# Patient Record
Sex: MALE | Race: WHITE | URBAN - METROPOLITAN AREA
[De-identification: names, ages, dates, MRNs, and addresses within clinical notes are randomized per-mention and may not be internally consistent; named-entity substitution may affect disease eponyms.]

---

## 2019-04-03 ENCOUNTER — OFFICE VISIT (OUTPATIENT)
Dept: INTERNAL MEDICINE CLINIC | Age: 34
End: 2019-04-03
Payer: MEDICAID

## 2019-04-03 VITALS
DIASTOLIC BLOOD PRESSURE: 68 MMHG | WEIGHT: 266.8 LBS | OXYGEN SATURATION: 98 % | HEIGHT: 77 IN | RESPIRATION RATE: 16 BRPM | SYSTOLIC BLOOD PRESSURE: 126 MMHG | HEART RATE: 59 BPM | BODY MASS INDEX: 31.5 KG/M2

## 2019-04-03 DIAGNOSIS — R06.81 APNEA SPELL: ICD-10-CM

## 2019-04-03 DIAGNOSIS — R06.83 SNORING: ICD-10-CM

## 2019-04-03 DIAGNOSIS — D17.9 MULTIPLE LIPOMAS: ICD-10-CM

## 2019-04-03 DIAGNOSIS — M70.62 GREATER TROCHANTERIC BURSITIS OF LEFT HIP: ICD-10-CM

## 2019-04-03 DIAGNOSIS — Z00.00 PHYSICAL EXAM, ANNUAL: ICD-10-CM

## 2019-04-03 DIAGNOSIS — J45.20 MILD INTERMITTENT REACTIVE AIRWAY DISEASE WITHOUT COMPLICATION: ICD-10-CM

## 2019-04-03 PROBLEM — J45.909 REACTIVE AIRWAY DISEASE WITHOUT COMPLICATION: Status: ACTIVE | Noted: 2019-04-03

## 2019-04-03 LAB
BILIRUBIN, POC: NORMAL
BLOOD URINE, POC: NORMAL
CLARITY, POC: CLEAR
COLOR, POC: YELLOW
GLUCOSE URINE, POC: NORMAL
KETONES, POC: NORMAL
LEUKOCYTE EST, POC: NORMAL
NITRITE, POC: NORMAL
PH, POC: 7
PROTEIN, POC: NORMAL
SPECIFIC GRAVITY, POC: 1.02
UROBILINOGEN, POC: 0.2

## 2019-04-03 PROCEDURE — 90471 IMMUNIZATION ADMIN: CPT | Performed by: INTERNAL MEDICINE

## 2019-04-03 PROCEDURE — 81002 URINALYSIS NONAUTO W/O SCOPE: CPT | Performed by: INTERNAL MEDICINE

## 2019-04-03 PROCEDURE — 90715 TDAP VACCINE 7 YRS/> IM: CPT | Performed by: INTERNAL MEDICINE

## 2019-04-03 PROCEDURE — 99385 PREV VISIT NEW AGE 18-39: CPT | Performed by: INTERNAL MEDICINE

## 2019-04-03 RX ORDER — ALBUTEROL SULFATE 90 UG/1
2 AEROSOL, METERED RESPIRATORY (INHALATION) EVERY 6 HOURS PRN
Qty: 1 INHALER | Refills: 0 | Status: SHIPPED | OUTPATIENT
Start: 2019-04-03 | End: 2021-01-12

## 2019-04-03 SDOH — HEALTH STABILITY: MENTAL HEALTH: HOW OFTEN DO YOU HAVE A DRINK CONTAINING ALCOHOL?: 2-3 TIMES A WEEK

## 2019-04-03 SDOH — HEALTH STABILITY: MENTAL HEALTH: HOW MANY STANDARD DRINKS CONTAINING ALCOHOL DO YOU HAVE ON A TYPICAL DAY?: 1 OR 2

## 2019-04-03 ASSESSMENT — ENCOUNTER SYMPTOMS
WHEEZING: 1
CHEST TIGHTNESS: 0
BACK PAIN: 1
SORE THROAT: 0
COUGH: 1
SHORTNESS OF BREATH: 0
GASTROINTESTINAL NEGATIVE: 1

## 2019-04-03 ASSESSMENT — PATIENT HEALTH QUESTIONNAIRE - PHQ9
SUM OF ALL RESPONSES TO PHQ QUESTIONS 1-9: 0
SUM OF ALL RESPONSES TO PHQ QUESTIONS 1-9: 0
1. LITTLE INTEREST OR PLEASURE IN DOING THINGS: 0
SUM OF ALL RESPONSES TO PHQ9 QUESTIONS 1 & 2: 0
2. FEELING DOWN, DEPRESSED OR HOPELESS: 0

## 2019-04-03 NOTE — PATIENT INSTRUCTIONS
Advised exercises to stretch iliofemoral band   Can use albuterol inhaler as need for wheezing ,if it gets worse.   Will check las -fasting  Continue weight loss measures and regular physical activities  See me as needed

## 2019-04-03 NOTE — PROGRESS NOTES
4/3/2019    Yennifer Garcia (:  1985) is a 26 y.o. male, here for evaluation of the following medical concerns:Annual visit. HPI  Had no health problems before  Has no symptoms and feels good  Once in a while has hip pain like under surface and feels numb under skin of left thigh and sore some times and had it for long time -more than 10 years and no getting worse and mostly when standing and rest relieves symptoms  He feels pain almost always and more when standing and no symptoms when sitting or sleeping. shows pain area lateral anterior thigh  Had several attempts at spinal anaesthesia for his ACL and meniscus  tear of right knee  Has no cp gi or gu symptoms   May have chronic cough when he lays down like  A tickle and some times makes him cough and has occasional wheezing  No symptoms during day and some times kat feels it when he runs in cold weather  Review of Systems   Constitutional: Negative for activity change, appetite change, fatigue, fever and unexpected weight change (lost 20 pounds in 1 year by trying to). HENT: Negative for postnasal drip, sneezing and sore throat. Eyes: Negative for visual disturbance. Respiratory: Positive for cough and wheezing. Negative for chest tightness and shortness of breath. Cardiovascular: Negative for chest pain, palpitations and leg swelling. Gastrointestinal: Negative. Endocrine: Negative. Genitourinary: Negative. Musculoskeletal: Positive for back pain (on and off). Arthralgias: some times in knees. Neurological: Negative for dizziness, light-headedness and headaches. Psychiatric/Behavioral: Negative for dysphoric mood and sleep disturbance (snores a lot and some  times and has erratic braething -noted by her fiancee and stops braething too and soem times sleepy during day  and used to have fatigue all the time. ). The patient is not nervous/anxious.         Prior to Visit Medications    Not on File        No Known Allergies    Past Medical History:   Diagnosis Date    ADHD (attention deficit hyperactivity disorder)     ADD     Anxiety     Depression        Past Surgical History:   Procedure Laterality Date    KNEE SURGERY      right        Social History     Socioeconomic History    Marital status: Single     Spouse name: Not on file    Number of children: Not on file    Years of education: Not on file    Highest education level: Not on file   Occupational History    Not on file   Social Needs    Financial resource strain: Not on file    Food insecurity:     Worry: Not on file     Inability: Not on file    Transportation needs:     Medical: Not on file     Non-medical: Not on file   Tobacco Use    Smoking status: Former Smoker     Types: Cigarettes    Smokeless tobacco: Never Used   Substance and Sexual Activity    Alcohol use: Yes     Frequency: 2-3 times a week     Drinks per session: 1 or 2     Binge frequency: Less than monthly    Drug use: Yes     Types: Marijuana    Sexual activity: Not on file   Lifestyle    Physical activity:     Days per week: Not on file     Minutes per session: Not on file    Stress: Not on file   Relationships    Social connections:     Talks on phone: Not on file     Gets together: Not on file     Attends Temple service: Not on file     Active member of club or organization: Not on file     Attends meetings of clubs or organizations: Not on file     Relationship status: Not on file    Intimate partner violence:     Fear of current or ex partner: Not on file     Emotionally abused: Not on file     Physically abused: Not on file     Forced sexual activity: Not on file   Other Topics Concern    Not on file   Social History Narrative    Not on file        Family History   Problem Relation Age of Onset    Arthritis Mother     Anemia Father     Arthritis Maternal Grandmother     Alcohol Abuse Maternal Grandfather     Stroke Maternal Grandfather     Alzheimer's Disease Paternal Grandmother Vitals:    04/03/19 1301   BP: 126/68   Site: Right Upper Arm   Position: Sitting   Cuff Size: Large Adult   Pulse: 59   Resp: 16   SpO2: 98%   Weight: 266 lb 12.8 oz (121 kg)   Height: 6' 5\" (1.956 m)     Estimated body mass index is 31.64 kg/m² as calculated from the following:    Height as of this encounter: 6' 5\" (1.956 m). Weight as of this encounter: 266 lb 12.8 oz (121 kg). Physical Exam   Constitutional: He is oriented to person, place, and time. He appears well-developed and well-nourished. No distress. HENT:   Head: Normocephalic. Right Ear: Tympanic membrane, external ear and ear canal normal.   Left Ear: Tympanic membrane, external ear and ear canal normal.   Nose: Nose normal.   Mouth/Throat: Oropharynx is clear and moist. No oropharyngeal exudate. Eyes: Pupils are equal, round, and reactive to light. Conjunctivae and EOM are normal. No scleral icterus. Neck: No JVD present. No thyromegaly present. Cardiovascular: Normal rate, regular rhythm, normal heart sounds and intact distal pulses. Exam reveals no gallop. No murmur heard. Pulmonary/Chest: Breath sounds normal. No respiratory distress. He has no wheezes (has no forced exp cough or wheez). He has no rales. Abdominal: Soft. Bowel sounds are normal. He exhibits no distension and no mass. There is no hepatosplenomegaly. There is no tenderness. Musculoskeletal: He exhibits no edema. Lymphadenopathy:     He has no cervical adenopathy. Neurological: He is alert and oriented to person, place, and time. He displays normal reflexes.  Coordination normal.   Has no weakness in his lower extremities and SLR negative on both sides  Has slight left troch tenderness+ and increases with abduction of LLE   Skin:   Has multiple 1-2 cm lipomas felt different areas of hsi body  Has f/o folliculitis type lesion -right forearm where his guitar comes in contact with hs fore arm and he is a mucician       ASSESSMENT/PLAN:  Possible he may have chronic mild trochanteric bursitis  Multiple lipomas  Has h/o folliculitis type lesions right fore arm  May have mild exercise induced asthma/RAD-has a dog and cat in house. No follow-ups on file. Plan    Advised exercises to stretch iliofemoral band   Can use albuterol inhaler as need for wheezing ,if it gets worse. Will check las -fasting  Continue weight loss measures and regular physical activities  See me as needed  An  electronic signature was used to authenticate this note.     --Alice Villa MD on 4/3/2019 at 1:17 PM

## 2019-05-03 PROBLEM — Z00.00 PHYSICAL EXAM, ANNUAL: Status: RESOLVED | Noted: 2019-04-03 | Resolved: 2019-05-03

## 2019-11-05 ENCOUNTER — NURSE ONLY (OUTPATIENT)
Dept: INTERNAL MEDICINE CLINIC | Age: 34
End: 2019-11-05
Payer: MEDICAID

## 2019-11-05 DIAGNOSIS — Z23 NEED FOR INFLUENZA VACCINATION: Primary | ICD-10-CM

## 2019-11-05 PROCEDURE — 90471 IMMUNIZATION ADMIN: CPT | Performed by: INTERNAL MEDICINE

## 2019-11-05 PROCEDURE — 90686 IIV4 VACC NO PRSV 0.5 ML IM: CPT | Performed by: INTERNAL MEDICINE

## 2019-11-22 ENCOUNTER — TELEPHONE (OUTPATIENT)
Dept: INTERNAL MEDICINE CLINIC | Age: 34
End: 2019-11-22

## 2021-01-07 ENCOUNTER — TELEPHONE (OUTPATIENT)
Dept: INTERNAL MEDICINE CLINIC | Age: 36
End: 2021-01-07

## 2021-01-07 NOTE — TELEPHONE ENCOUNTER
----- Message from Ever Crain sent at 1/7/2021  4:17 PM EST -----  Subject: Appointment Request    Reason for Call: Routine Physical Exam    QUESTIONS  Type of Appointment? Established Patient  Reason for appointment request? No appointments available during search  Additional Information for Provider? Patient called to schedule an Annual   Physical. Please give a call PT   ---------------------------------------------------------------------------  --------------  CALL BACK INFO  What is the best way for the office to contact you? OK to leave message on   voicemail  Preferred Call Back Phone Number? [phone]  ---------------------------------------------------------------------------  --------------  SCRIPT ANSWERS  Relationship to Patient? Self  Appointment reason? Well Care/Follow Ups  Select a Well Care/Follow Ups appointment reason? Adult Physical Exam   [Medicare Annual Wellness   AWV   PAP   Pelvic]  (If the patient is Medicare / 65+ ask this question) Are you requesting a   Medicare Annual Wellness Visit? No  (If the patient is female   ask this question) Are you requesting a pap smear with your physical   exam? No  (Is the patient requesting their annual physical and does not need PAP or   AWV per above)? Yes   Have you been diagnosed with   tested for   or told that you are suspected of having COVID-19 (Coronavirus)? No  Have you had a fever or taken medication to treat a fever within the past   3 days? No  Have you had a cough   shortness of breath or flu-like symptoms within the past 3 days? No  Do you currently have flu-like symptoms including fever or chills   cough   shortness of breath   or difficulty breathing   or new loss of taste or smell? No  (Service Expert  click yes below to proceed with FFFavs As Usual   Scheduling)?  Yes

## 2021-01-12 ENCOUNTER — OFFICE VISIT (OUTPATIENT)
Dept: INTERNAL MEDICINE CLINIC | Age: 36
End: 2021-01-12
Payer: MEDICAID

## 2021-01-12 VITALS
BODY MASS INDEX: 30.58 KG/M2 | OXYGEN SATURATION: 98 % | TEMPERATURE: 98 F | SYSTOLIC BLOOD PRESSURE: 120 MMHG | DIASTOLIC BLOOD PRESSURE: 80 MMHG | HEART RATE: 80 BPM | HEIGHT: 77 IN | RESPIRATION RATE: 16 BRPM | WEIGHT: 259 LBS

## 2021-01-12 DIAGNOSIS — G89.29 CHRONIC PAIN OF RIGHT HAND: ICD-10-CM

## 2021-01-12 DIAGNOSIS — Z13.31 POSITIVE DEPRESSION SCREENING: Primary | ICD-10-CM

## 2021-01-12 DIAGNOSIS — D17.9 MULTIPLE LIPOMAS: ICD-10-CM

## 2021-01-12 DIAGNOSIS — M79.641 CHRONIC PAIN OF RIGHT HAND: ICD-10-CM

## 2021-01-12 DIAGNOSIS — F43.20 ADJUSTMENT DISORDER, UNSPECIFIED TYPE: ICD-10-CM

## 2021-01-12 DIAGNOSIS — Z00.00 ANNUAL PHYSICAL EXAM: ICD-10-CM

## 2021-01-12 LAB
BILIRUBIN, POC: NORMAL
BLOOD URINE, POC: NORMAL
CLARITY, POC: NORMAL
COLOR, POC: NORMAL
GLUCOSE URINE, POC: NORMAL
KETONES, POC: NORMAL
LEUKOCYTE EST, POC: NORMAL
NITRITE, POC: NORMAL
PH, POC: 6.5
PROTEIN, POC: NORMAL
SPECIFIC GRAVITY, POC: >=1.03
UROBILINOGEN, POC: NORMAL

## 2021-01-12 PROCEDURE — G8484 FLU IMMUNIZE NO ADMIN: HCPCS | Performed by: INTERNAL MEDICINE

## 2021-01-12 PROCEDURE — G8431 POS CLIN DEPRES SCRN F/U DOC: HCPCS | Performed by: INTERNAL MEDICINE

## 2021-01-12 PROCEDURE — 81002 URINALYSIS NONAUTO W/O SCOPE: CPT | Performed by: INTERNAL MEDICINE

## 2021-01-12 PROCEDURE — 99395 PREV VISIT EST AGE 18-39: CPT | Performed by: INTERNAL MEDICINE

## 2021-01-12 PROCEDURE — 93000 ELECTROCARDIOGRAM COMPLETE: CPT | Performed by: INTERNAL MEDICINE

## 2021-01-12 SDOH — ECONOMIC STABILITY: FOOD INSECURITY: WITHIN THE PAST 12 MONTHS, THE FOOD YOU BOUGHT JUST DIDN'T LAST AND YOU DIDN'T HAVE MONEY TO GET MORE.: NEVER TRUE

## 2021-01-12 SDOH — ECONOMIC STABILITY: FOOD INSECURITY: WITHIN THE PAST 12 MONTHS, YOU WORRIED THAT YOUR FOOD WOULD RUN OUT BEFORE YOU GOT MONEY TO BUY MORE.: NEVER TRUE

## 2021-01-12 SDOH — ECONOMIC STABILITY: TRANSPORTATION INSECURITY
IN THE PAST 12 MONTHS, HAS THE LACK OF TRANSPORTATION KEPT YOU FROM MEDICAL APPOINTMENTS OR FROM GETTING MEDICATIONS?: NO

## 2021-01-12 SDOH — ECONOMIC STABILITY: TRANSPORTATION INSECURITY
IN THE PAST 12 MONTHS, HAS LACK OF TRANSPORTATION KEPT YOU FROM MEETINGS, WORK, OR FROM GETTING THINGS NEEDED FOR DAILY LIVING?: NO

## 2021-01-12 ASSESSMENT — PATIENT HEALTH QUESTIONNAIRE - PHQ9
7. TROUBLE CONCENTRATING ON THINGS, SUCH AS READING THE NEWSPAPER OR WATCHING TELEVISION: 2
3. TROUBLE FALLING OR STAYING ASLEEP: 1
9. THOUGHTS THAT YOU WOULD BE BETTER OFF DEAD, OR OF HURTING YOURSELF: 1
SUM OF ALL RESPONSES TO PHQ QUESTIONS 1-9: 18
4. FEELING TIRED OR HAVING LITTLE ENERGY: 3
SUM OF ALL RESPONSES TO PHQ QUESTIONS 1-9: 18
2. FEELING DOWN, DEPRESSED OR HOPELESS: 1
6. FEELING BAD ABOUT YOURSELF - OR THAT YOU ARE A FAILURE OR HAVE LET YOURSELF OR YOUR FAMILY DOWN: 3

## 2021-01-12 ASSESSMENT — ENCOUNTER SYMPTOMS
TROUBLE SWALLOWING: 0
CHEST TIGHTNESS: 0
SORE THROAT: 0
SHORTNESS OF BREATH: 0
ABDOMINAL PAIN: 0
GASTROINTESTINAL NEGATIVE: 1
RHINORRHEA: 0
COUGH: 0

## 2021-01-12 ASSESSMENT — COLUMBIA-SUICIDE SEVERITY RATING SCALE - C-SSRS: 1. WITHIN THE PAST MONTH, HAVE YOU WISHED YOU WERE DEAD OR WISHED YOU COULD GO TO SLEEP AND NOT WAKE UP?: NO

## 2021-01-12 NOTE — PATIENT INSTRUCTIONS
Should decrease consumption of coffee to 2 cups daily   Need to loose weight and exercise regularly   Referral to hand surgeon for evaluation of his hand pain   Referral to see psychologist for evaluation of his symptoms -he is agreeable for evaluations  Need fasting labs

## 2021-01-12 NOTE — PROGRESS NOTES
2021    Alfredo Portillo (:  1985) is a 38 y.o. male, here for a preventive medicine evaluation. Quit smoking cigarettes and he feels better with his breathing and quit in 10  Drinks lot of coffee  And has no symptoms with activity  Has multiple spots under his skin and not cause any symptoms and not increasing in size  Has no cp gi or gu symptoms   At times his right hand hurts and he plays [employer] and also few months back fel with his rigth hand stretched and had pain over his and for few weeks but did not see swelling or decrease in rom and since schwarz eneida when he presses on it  Feels depressed for years and seem to be more last few months -he has issues with climate change politics and with peoples divide and at times argues with his partner and can not forget fr few weeks and some times feels he can not do any thing correctly   Victor Hugo Correia sno ideas of hurting himself and not feel overwhelmed and has no change in sleep appetite or weight and nto feel anxious but thinks about issues a lot  He was treated for ADHD in his childhood and never treated  for depression . Patient Active Problem List   Diagnosis    Reactive airway disease without complication    Greater trochanteric bursitis of left hip    Multiple lipomas    Snoring    Apnea spell       Review of Systems   Constitutional: Negative for activity change, appetite change, fatigue and unexpected weight change. HENT: Negative for hearing loss, postnasal drip, rhinorrhea, sore throat and trouble swallowing. Eyes: Negative for visual disturbance. Respiratory: Negative for cough, chest tightness and shortness of breath. Cardiovascular: Negative for chest pain, palpitations and leg swelling. Gastrointestinal: Negative. Negative for abdominal pain (has no heart burns if he eats right foods ). Genitourinary: Negative. Musculoskeletal: Arthralgias: sorenes sin joints but no pain or stiffness. Psychiatric/Behavioral: Positive for dysphoric mood. Negative for agitation (at times when in arguments with his partner ), behavioral problems, sleep disturbance and suicidal ideas. Decreased concentration: was treated before for ADHD  The patient is not nervous/anxious. Prior to Visit Medications    Not on File        No Known Allergies    Past Medical History:   Diagnosis Date    ADHD (attention deficit hyperactivity disorder)     ADD     Anxiety     Depression        Past Surgical History:   Procedure Laterality Date    KNEE SURGERY      right        Social History     Socioeconomic History    Marital status: Life Partner     Spouse name: Not on file    Number of children: Not on file    Years of education: Not on file    Highest education level: Not on file   Occupational History    Not on file   Social Needs    Financial resource strain: Not hard at all   [employer] insecurity     Worry: Never true     Inability: Never true   [employer] needs     Medical: No     Non-medical: No   Tobacco Use    Smoking status: Former Smoker     Packs/day: 0.25     Types: Cigarettes    Smokeless tobacco: Never Used    Tobacco comment: may be 1/4 pack a day and some days not smoke at all.    Substance and Sexual Activity    Alcohol use: Yes     Frequency: 2-3 times a week     Drinks per session: 1 or 2     Binge frequency: Less than monthly     Comment: 2 times a week and 2 beers    Drug use: Yes     Types: Marijuana    Sexual activity: Not on file   Lifestyle    Physical activity     Days per week: Not on file     Minutes per session: Not on file    Stress: Not on file   Relationships    Social connections     Talks on phone: Not on file     Gets together: Not on file     Attends Temple service: Not on file     Active member of club or organization: Not on file     Attends meetings of clubs or organizations: Not on file     Relationship status: Not on file    Intimate partner violence Fear of current or ex partner: Not on file     Emotionally abused: Not on file     Physically abused: Not on file     Forced sexual activity: Not on file   Other Topics Concern    Not on file   Social History Narrative    Not on file        Family History   Problem Relation Age of Onset    Arthritis Mother     Anemia Father     Arthritis Maternal Grandmother     Alcohol Abuse Maternal Grandfather     Stroke Maternal Grandfather     Alzheimer's Disease Paternal Grandmother        ADVANCE DIRECTIVE: N, <no information>    Vitals:    01/12/21 1332   BP: 130/70   Site: Left Upper Arm   Position: Sitting   Cuff Size: Large Adult   Pulse: 58   Resp: 16   Temp: 98 °F (36.7 °C)   SpO2: 98%   Weight: 259 lb (117.5 kg)   Height: 6' 5\" (1.956 m)     Estimated body mass index is 30.71 kg/m² as calculated from the following:    Height as of this encounter: 6' 5\" (1.956 m). Weight as of this encounter: 259 lb (117.5 kg). Physical Exam  Vitals signs and nursing note reviewed. Constitutional:       General: He is not in acute distress. Appearance: Normal appearance. HENT:      Head: Normocephalic. Right Ear: Tympanic membrane, ear canal and external ear normal. There is no impacted cerumen. Left Ear: Tympanic membrane, ear canal and external ear normal. There is no impacted cerumen. Nose: Nose normal. No congestion or rhinorrhea. Mouth/Throat:      Mouth: Mucous membranes are moist.      Pharynx: Oropharynx is clear. No oropharyngeal exudate or posterior oropharyngeal erythema. Eyes:      General: No scleral icterus. Extraocular Movements: Extraocular movements intact. Conjunctiva/sclera: Conjunctivae normal.      Pupils: Pupils are equal, round, and reactive to light. Neck:      Thyroid: No thyromegaly. Vascular: No carotid bruit or JVD. Cardiovascular:      Rate and Rhythm: Normal rate and regular rhythm. Pulses: Normal pulses. Heart sounds: Normal heart sounds. No murmur. No gallop. Comments: Has occasional skip beats in his rhythm  Pulmonary:      Effort: No respiratory distress. Breath sounds: Normal breath sounds. No wheezing, rhonchi or rales. Abdominal:      General: Bowel sounds are normal. There is no distension. Palpations: Abdomen is soft. There is no hepatomegaly, splenomegaly or mass. Tenderness: There is no abdominal tenderness. Hernia: No hernia is present. Genitourinary:     Penis: Normal.       Testes: Normal.   Musculoskeletal:      Right lower leg: No edema. Left lower leg: No edema. Comments: Ha s no swelling or tenderness rigth wrist and hand and has no deformity and rom are full and has no bone tenderness of carpal or metacarpal bones    Lymphadenopathy:      Cervical: No cervical adenopathy. Skin:     Comments: Has multiple 1-2 cm lipomas oyer is arms below left breast and over his lumbar spine and all are soft fluffy round or oval and moveable and regular margins   Neurological:      General: No focal deficit present. Mental Status: He is alert and oriented to person, place, and time. Psychiatric:         Mood and Affect: Mood normal.         Thought Content: Thought content normal.         Judgment: Judgment normal.         No flowsheet data found. No results found for: CHOL, CHOLFAST, TRIG, TRIGLYCFAST, HDL, LDLCHOLESTEROL, LDLCALC, GLUF, GLUCOSE, LABA1C    The ASCVD Risk score ([address] et al., 2013) failed to calculate for the following reasons:     The 2013 ASCVD risk score is only valid for ages 36 to 78    Immunization History   Administered Date(s) Administered    Influenza, Quadv, IM, PF (6 mo and older Fluzone, Flulaval, Fluarix, and 3 yrs and older Afluria) 11/05/2019    Tdap (Boostrix, Adacel) 04/03/2019       Health Maintenance   Topic Date Due    Hepatitis C screen  1985

## 2021-01-26 ENCOUNTER — NURSE ONLY (OUTPATIENT)
Dept: INTERNAL MEDICINE CLINIC | Age: 36
End: 2021-01-26
Payer: MEDICAID

## 2021-01-26 DIAGNOSIS — Z00.00 ANNUAL PHYSICAL EXAM: Primary | ICD-10-CM

## 2021-01-26 LAB
A/G RATIO: 2.4 (ref 1.1–2.2)
ALBUMIN SERPL-MCNC: 4.8 G/DL (ref 3.4–5)
ALP BLD-CCNC: 66 U/L (ref 40–129)
ALT SERPL-CCNC: 16 U/L (ref 10–40)
ANION GAP SERPL CALCULATED.3IONS-SCNC: 9 MMOL/L (ref 3–16)
AST SERPL-CCNC: 18 U/L (ref 15–37)
BASOPHILS ABSOLUTE: 0 K/UL (ref 0–0.2)
BASOPHILS RELATIVE PERCENT: 0.6 %
BILIRUB SERPL-MCNC: 0.5 MG/DL (ref 0–1)
BUN BLDV-MCNC: 21 MG/DL (ref 7–20)
CALCIUM SERPL-MCNC: 9.5 MG/DL (ref 8.3–10.6)
CHLORIDE BLD-SCNC: 103 MMOL/L (ref 99–110)
CHOLESTEROL, TOTAL: 143 MG/DL (ref 0–199)
CO2: 29 MMOL/L (ref 21–32)
CREAT SERPL-MCNC: 1.1 MG/DL (ref 0.9–1.3)
EOSINOPHILS ABSOLUTE: 0.2 K/UL (ref 0–0.6)
EOSINOPHILS RELATIVE PERCENT: 4.4 %
GFR AFRICAN AMERICAN: >60
GFR NON-AFRICAN AMERICAN: >60
GLOBULIN: 2 G/DL
GLUCOSE BLD-MCNC: 94 MG/DL (ref 70–99)
HCT VFR BLD CALC: 44.1 % (ref 40.5–52.5)
HDLC SERPL-MCNC: 48 MG/DL (ref 40–60)
HEMOGLOBIN: 15 G/DL (ref 13.5–17.5)
HEPATITIS C ANTIBODY INTERPRETATION: NORMAL
LDL CHOLESTEROL CALCULATED: 81 MG/DL
LYMPHOCYTES ABSOLUTE: 1.6 K/UL (ref 1–5.1)
LYMPHOCYTES RELATIVE PERCENT: 35.6 %
MCH RBC QN AUTO: 30.2 PG (ref 26–34)
MCHC RBC AUTO-ENTMCNC: 34.1 G/DL (ref 31–36)
MCV RBC AUTO: 88.7 FL (ref 80–100)
MONOCYTES ABSOLUTE: 0.3 K/UL (ref 0–1.3)
MONOCYTES RELATIVE PERCENT: 6.4 %
NEUTROPHILS ABSOLUTE: 2.3 K/UL (ref 1.7–7.7)
NEUTROPHILS RELATIVE PERCENT: 53 %
PDW BLD-RTO: 13 % (ref 12.4–15.4)
PLATELET # BLD: 183 K/UL (ref 135–450)
PMV BLD AUTO: 9.2 FL (ref 5–10.5)
POTASSIUM SERPL-SCNC: 4.8 MMOL/L (ref 3.5–5.1)
RBC # BLD: 4.97 M/UL (ref 4.2–5.9)
SODIUM BLD-SCNC: 141 MMOL/L (ref 136–145)
TOTAL PROTEIN: 6.8 G/DL (ref 6.4–8.2)
TRIGL SERPL-MCNC: 68 MG/DL (ref 0–150)
TSH SERPL DL<=0.05 MIU/L-ACNC: 2.85 UIU/ML (ref 0.27–4.2)
VLDLC SERPL CALC-MCNC: 14 MG/DL
WBC # BLD: 4.4 K/UL (ref 4–11)

## 2021-01-26 PROCEDURE — 36415 COLL VENOUS BLD VENIPUNCTURE: CPT | Performed by: INTERNAL MEDICINE

## 2021-01-27 LAB
HIV AG/AB: NORMAL
HIV ANTIGEN: NORMAL
HIV-1 ANTIBODY: NORMAL
HIV-2 AB: NORMAL

## 2021-02-11 PROBLEM — Z00.00 ANNUAL PHYSICAL EXAM: Status: RESOLVED | Noted: 2021-01-12 | Resolved: 2021-02-11

## 2021-09-15 ENCOUNTER — OFFICE VISIT (OUTPATIENT)
Dept: PSYCHOLOGY | Age: 36
End: 2021-09-15
Payer: MEDICAID

## 2021-09-15 DIAGNOSIS — F90.2 ATTENTION DEFICIT HYPERACTIVITY DISORDER, COMBINED TYPE, MODERATE: ICD-10-CM

## 2021-09-15 DIAGNOSIS — F34.1 PERSISTENT DEPRESSIVE DISORDER WITH ANXIOUS DISTRESS, CURRENTLY SEVERE: Primary | ICD-10-CM

## 2021-09-15 PROCEDURE — 1036F TOBACCO NON-USER: CPT | Performed by: PSYCHOLOGIST

## 2021-09-15 PROCEDURE — 90791 PSYCH DIAGNOSTIC EVALUATION: CPT | Performed by: PSYCHOLOGIST

## 2021-09-15 ASSESSMENT — ANXIETY QUESTIONNAIRES
5. BEING SO RESTLESS THAT IT IS HARD TO SIT STILL: 2-OVER HALF THE DAYS
2. NOT BEING ABLE TO STOP OR CONTROL WORRYING: 2-OVER HALF THE DAYS
6. BECOMING EASILY ANNOYED OR IRRITABLE: 2-OVER HALF THE DAYS
GAD7 TOTAL SCORE: 18
1. FEELING NERVOUS, ANXIOUS, OR ON EDGE: 3-NEARLY EVERY DAY
3. WORRYING TOO MUCH ABOUT DIFFERENT THINGS: 3-NEARLY EVERY DAY
7. FEELING AFRAID AS IF SOMETHING AWFUL MIGHT HAPPEN: 3-NEARLY EVERY DAY
4. TROUBLE RELAXING: 3-NEARLY EVERY DAY

## 2021-09-15 ASSESSMENT — PATIENT HEALTH QUESTIONNAIRE - PHQ9
2. FEELING DOWN, DEPRESSED OR HOPELESS: 3
SUM OF ALL RESPONSES TO PHQ9 QUESTIONS 1 & 2: 6
8. MOVING OR SPEAKING SO SLOWLY THAT OTHER PEOPLE COULD HAVE NOTICED. OR THE OPPOSITE, BEING SO FIGETY OR RESTLESS THAT YOU HAVE BEEN MOVING AROUND A LOT MORE THAN USUAL: 1
6. FEELING BAD ABOUT YOURSELF - OR THAT YOU ARE A FAILURE OR HAVE LET YOURSELF OR YOUR FAMILY DOWN: 3
3. TROUBLE FALLING OR STAYING ASLEEP: 1
9. THOUGHTS THAT YOU WOULD BE BETTER OFF DEAD, OR OF HURTING YOURSELF: 1
SUM OF ALL RESPONSES TO PHQ QUESTIONS 1-9: 20
4. FEELING TIRED OR HAVING LITTLE ENERGY: 3
SUM OF ALL RESPONSES TO PHQ QUESTIONS 1-9: 19
7. TROUBLE CONCENTRATING ON THINGS, SUCH AS READING THE NEWSPAPER OR WATCHING TELEVISION: 2
SUM OF ALL RESPONSES TO PHQ QUESTIONS 1-9: 20
5. POOR APPETITE OR OVEREATING: 3
1. LITTLE INTEREST OR PLEASURE IN DOING THINGS: 3

## 2021-09-15 NOTE — PROGRESS NOTES
Behavioral Health Consultation  Aristides Merritt, Ph.D.  Psychologist  9/15/2021  10:58 AM EDT      Time spent with Patient: 30 minutes  This is patient's first [facility] appointment. Reason for Consult: anxious, worried, some anger  Referring Provider: Yokasta Rose, [address] [facility] [id number]    Feedback for PCP:     Pt provided informed consent for the behavioral health program. Discussed with patient model of service to include the limits of confidentiality (i.e. abuse reporting, suicide intervention, etc.) and short-term intervention focused approach. Pt indicated understanding. Feedback given to PCP. S:  Patient reports that he feels as though he's needed help for a while but has been reluctant to seek help. He said he came in today because his partner with whom he has one child and another on the way, suggested it. 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 He stays at home with their 6year-old. He says that he feels criticized in most everything he tries to do, and feels \"unappreciated\" for what he is doing when it seems it is only what he's not doing that is pointed out. He said that they bought a house when they became pregnant and that\" I didn't feel like I had much of a say in it,\" saying that she pointed out that \"it's my money. \" When he mentioned that one of his bandmates \"got a sweet new guitar,\" she said something about him getting a job, \"when my job is being a musician. \" It seems as though she wants him to be more like her and less like himself, according to the patient. He describes feeling as though he's frequently put in a double bind. He is quick to point out that he is not perfect, but the way it's feeling doesn't feel good. He said he had been diagnosed with ADHD as a child and was treated with Ritalin, but having ADHD \"made me feel like there's something wrong with me. \"        Social History     Tobacco Use    Smoking status: Former Smoker     Packs/day: 0.25     Types: Cigarettes    Smokeless tobacco: Never Used    Tobacco comment: may be 1/4 pack a day and some days not smoke at all. Substance Use Topics    Alcohol use: Yes     Comment: 2 times a week and 2 beers        Illicit drugs:   Social History     Substance and Sexual Activity   Drug Use Yes    Types: Marijuana        O:  MSE:  Appearance: good hygiene   Attitude: cooperative and friendly  Consciousness: alert  Orientation: oriented to person, place, time, general circumstance  Memory: recent and remote memory intact  Attention/Concentration: intact during session  Psychomotor Activity: normal  Eye Contact: normal  Speech: normal rate and volume, well-articulated  Mood: anxious, sad  Affect: congruent  Perception: within normal limits  Thought Content: within normal limits  Thought Process: logical, coherent and goal-directed  Insight: good  Judgment: intact  Ability to understand instructions: Yes  Ability to respond meaningfully: Yes  Morbid Ideation: no   Suicide Assessment: no suicidal ideation, plan, or intent  Homicidal Ideation: no    A:  We talked about communication and elements of fair fighting, and that what he's describing is a couple communication problem and not his individual problem, that the dynamic between them might need some attention. He said that he is terrified of splitting up because his family has always been close and marriages intact, whereas she has not had that experience. He was given the Adult ADHD RS-IV self-report form, not for diagnosis because he has been previously diagnosed, but to see how he is experiencing ADHD currently. WILDA 7 SCORE 9/15/2021   WILDA-7 Total Score 18     Interpretation of WILDA-7 score: 5-9 = mild anxiety, 10-14 = moderate anxiety, 15+ = severe anxiety. Recommend referral to behavioral health for scores 10 or greater.     PHQ Scores 9/15/2021 1/12/2021 4/3/2019   PHQ2 Score 6 3 0   PHQ9 Score 20 18 0     Interpretation of Total Score Depression Severity: 1-4 = Minimal depression, 5-9 = Mild depression, 10-14 = Moderate depression, 15-19 = Moderately severe depression, 20-27 = Severe depression    Diagnosis:    1. Persistent depressive disorder with anxious distress, currently severe    2. Attention deficit hyperactivity disorder, combined type, moderate        Patient Active Problem List   Diagnosis    Reactive airway disease without complication    Greater trochanteric bursitis of left hip    Multiple lipomas    Snoring    Apnea spell    Adjustment disorder         Plan:  Pt interventions:  Established rapport, Summit-setting to identify pt's primary goals for [facility] visit / overall health, Supportive techniques, Provided Psychoeducation re: healthy communication patterns and Provided handout on Adult ADHD RS-IV assessment.        Pt Behavioral Change Plan:  Pt set the following goals:  Pt scheduled F/U visit in two weeks

## 2021-09-29 ENCOUNTER — OFFICE VISIT (OUTPATIENT)
Dept: PSYCHOLOGY | Age: 36
End: 2021-09-29
Payer: MEDICAID

## 2021-09-29 DIAGNOSIS — F43.23 ADJUSTMENT DISORDER WITH MIXED ANXIETY AND DEPRESSED MOOD: Primary | ICD-10-CM

## 2021-09-29 DIAGNOSIS — F90.2 ATTENTION DEFICIT HYPERACTIVITY DISORDER, COMBINED TYPE, MODERATE: ICD-10-CM

## 2021-09-29 PROCEDURE — 90832 PSYTX W PT 30 MINUTES: CPT | Performed by: PSYCHOLOGIST

## 2021-09-29 PROCEDURE — 1036F TOBACCO NON-USER: CPT | Performed by: PSYCHOLOGIST

## 2021-09-29 ASSESSMENT — ANXIETY QUESTIONNAIRES
7. FEELING AFRAID AS IF SOMETHING AWFUL MIGHT HAPPEN: 2-OVER HALF THE DAYS
1. FEELING NERVOUS, ANXIOUS, OR ON EDGE: 3-NEARLY EVERY DAY
6. BECOMING EASILY ANNOYED OR IRRITABLE: 2-OVER HALF THE DAYS
5. BEING SO RESTLESS THAT IT IS HARD TO SIT STILL: 1-SEVERAL DAYS
4. TROUBLE RELAXING: 2-OVER HALF THE DAYS
GAD7 TOTAL SCORE: 15
2. NOT BEING ABLE TO STOP OR CONTROL WORRYING: 2-OVER HALF THE DAYS
3. WORRYING TOO MUCH ABOUT DIFFERENT THINGS: 3-NEARLY EVERY DAY

## 2021-09-29 ASSESSMENT — PATIENT HEALTH QUESTIONNAIRE - PHQ9
SUM OF ALL RESPONSES TO PHQ QUESTIONS 1-9: 2
SUM OF ALL RESPONSES TO PHQ QUESTIONS 1-9: 2
2. FEELING DOWN, DEPRESSED OR HOPELESS: 1
SUM OF ALL RESPONSES TO PHQ9 QUESTIONS 1 & 2: 2
1. LITTLE INTEREST OR PLEASURE IN DOING THINGS: 1
SUM OF ALL RESPONSES TO PHQ QUESTIONS 1-9: 2

## 2021-09-29 NOTE — PROGRESS NOTES
Behavioral Health Consultation  Meera Villarreal, Ph.D.  Psychologist  9/29/2021  11:30 AM EDT      Time spent with Patient: 30 minutes  This is patient's second [facility] appointment. Reason for Consult: anxious, worried, some anger  Referring Provider: Chasidy Hobson, [facility] [id number]    Feedback for PCP:     Pt provided informed consent for the behavioral health program. Discussed with patient model of service to include the limits of confidentiality (i.e. abuse reporting, suicide intervention, etc.) and short-term intervention focused approach. Pt indicated understanding. Feedback given to PCP. S:  Patient completed the Adult ADHD-RS-IV assessment even though he has already been diagnosed because the items help identify common frustrations not only the the patient but for his domestic partner as well. He described the situations that are most likely to cause conflict/disagreemnets an they seem to stem primarily from differences in temperament and emotional range, with the patient seeingly having a wider range of emotion as well as comfort expressing feelings. Social History     Tobacco Use    Smoking status: Former Smoker     Packs/day: 0.25     Types: Cigarettes    Smokeless tobacco: Never Used    Tobacco comment: may be 1/4 pack a day and some days not smoke at all.    Substance Use Topics    Alcohol use: Yes     Comment: 2 times a week and 2 beers        Illicit drugs:   Social History     Substance and Sexual Activity   Drug Use Yes    Types: Marijuana        O:  MSE:  Appearance: good hygiene   Attitude: cooperative and friendly  Consciousness: alert  Orientation: oriented to person, place, time, general circumstance  Memory: recent and remote memory intact  Attention/Concentration: intact during session  Psychomotor Activity: normal  Eye Contact: normal  Speech: normal rate and volume, well-articulated  Mood: anxious  Affect: congruent  Perception: within normal limits  Thought Content: within normal limits  Thought Process: logical, coherent and goal-directed  Insight: good  Judgment: intact  Ability to understand instructions: Yes  Ability to respond meaningfully: Yes  Morbid Ideation: no   Suicide Assessment: no suicidal ideation, plan, or intent  Homicidal Ideation: no    A:  We talked about strategies for increasing healthy communication exchanges as well as conflict management. The patient mentioned that his DP has stated that she has thought that she might have ADHD as well so the patient was given a SASI for her. He was given handouts on monitoring thinking errors as well as examples of common thinking errors. He was also given a handout on communication tips for couples. WILDA 7 SCORE 9/29/2021 9/15/2021   WILDA-7 Total Score 15 18     Interpretation of WILDA-7 score: 5-9 = mild anxiety, 10-14 = moderate anxiety, 15+ = severe anxiety. Recommend referral to behavioral health for scores 10 or greater. PHQ Scores 9/29/2021 9/15/2021 1/12/2021 4/3/2019   PHQ2 Score 2 6 3 0   PHQ9 Score 2 20 18 0     Interpretation of Total Score Depression Severity: 1-4 = Minimal depression, 5-9 = Mild depression, 10-14 = Moderate depression, 15-19 = Moderately severe depression, 20-27 = Severe depression    Diagnosis:    1. Adjustment disorder with mixed anxiety and depressed mood    2. Attention deficit hyperactivity disorder, combined type, moderate        Patient Active Problem List   Diagnosis    Reactive airway disease without complication    Greater trochanteric bursitis of left hip    Multiple lipomas    Snoring    Apnea spell    Adjustment disorder         Plan:  Pt interventions:  Established rapport, [place]-setting to identify pt's primary goals for [facility] visit / overall health, Supportive techniques, Provided Psychoeducation re: healthy communication patterns and Provided handout on Adult ADHD RS-IV assessment.        Pt Behavioral Change Plan:  Pt set the following goals:  Pt scheduled F/U visit in two weeks  See section 'A'

## 2021-09-29 NOTE — PATIENT INSTRUCTIONS
The [address] [facility]) Consultant giving you this message provides team-based care to treat the mind and body. He works directly with your doctor, who will always stay in charge of your care. Most [address] visits are 30 minutes or less. Usually, the [facility] provider and your doctor continue to see you until you are starting to do better and have a good plan in place for continued progress. Once that happens, most patients follow-up with just their doctor (though the [address] [place] [address] Street provider remains available to you as needed). If you are not improving, or if you desire outside mental health treatment, or if your doctor recommends more specialized help, we will be happy to help you find a mental health specialist in the community. Please also note your health insurance may be billed for [address] visits. Check with your insurance company, and tell the [address] [place] [address] Street provider, if you have any questions about your [address] coverage. Personal Thought  Control. Our thinking often creates anxiety for us. Getting better control of our thinking can go a long way in helping us cope. The following steps can be useful. 1. Let yourself become aware of thoughts you have when you are anxious. What are the words that you are saying to yourself at that moment? Sometimes it takes a little practice before we become aware of our thoughts. Some examples might be:  I know something bad is going to happen, or This is horrible or [place] is this happening to me!?  2. Write your thoughts down. Its much easier to work with our thoughts, analyze them, and replace them if they are in black and white.   3. Ask yourself the following questions about your thoughts:  a. Is it true? (Is it logically correct? Where is the evidence to support the truth of that thought? Are there alternative ways of thinking that would be more correct?). If a thought is not as true as it could be, replace it with a more realistic and helpful one.   The majority of thoughts we have that generate anxiety are not the most realistic appraisals of the situation. b. So what? (If this is logically correct, what does it mean to me? Is there anything I can do about the situation? Is it in my best interest to get anxious about this?). 4. Use coping self-statements. When feeling anxious, you may be able to tell yourself automatic phrases without thinking too much about it. A couple of examples would be phrases such as Its OK, I can handle it, or Ive been through things like this before and have done all right.   Notice that these statements tend to be true for all of us. 5. Notice a change in your emotional state as you change your thinking. As your thoughts become more realistic, you will probably notice a decrease in anxiety and tension, and an increase in your ability to cope. Thinking Errors That Increase Stress, Anger, Depression, Anxiety, and Worry  All-or-Nothing Thinking. You see things in black-and-white categories. It is either one thing or another; there is no room for anything in between. I'm 100% healthy or I must have a fatal disease.   Jumping to Conclusions. You make a negative interpretation even though there are no definite facts that convincingly support your conclusion. My  is late because he has been in a car accident and is now injured on the side of the road.   Fortune-Telling. You anticipate things will turn out badly, convinced the prediction is a fact. Not getting this job will cause us to lose the house.   Should Statements. Musts and oughts are also offenders. Emotional consequences can include anxiety and anger. I should be able to handle this.    Overgeneralization. Assuming one event is actually a pattern. My hand is a little shaky today, I must have Parkinson's disease.   Disqualifying the Positive. Filtering out or rejecting positive experiences to maintain negative beliefs.  Upon hearing that your spouse has checked all the doors and windows and they are all locked you think, But someone could cut out a piece of glass and open the window.   Catastrophizing. Predicting the worst possible outcome imaginable. Terrible, awful, horrible, worst ever might be key words. If I can't get my heart to stop pounding I'm going to die.    Superstitious Thinking. The thought that something you do prevents something awful from happening. [place] my spouse a hug and telling her to be careful before going to work will prevent her from getting in a wreck. I do it every morning and she hasn't gotten in a wreck yet.   Emotional Reasoning. The belief that because you feel a certain way means that the assumptions and association you have with that feeling are true. The fear, doom, and constant anxiety must mean something is seriously wrong with me.     Malhotra-Aquino assessment     15 Suggestions for Constructive Conflict Management    1. Be specific when you introduce a gripe. 2. Dont just complain; no matter how specifically; ask for a reasonable change that will relieve the gripe. 3. Ask for and give feedback of the major points, to make sure you are heard, to assure your partner that you understand what he (or she) wants. 4. Confine yourself to one issue at a time. Otherwise, without professional guidance, you may skip back and forth, evading the hard ones. 5. Do not be glib or intolerant. Be open to your own feelings, and equally open to your partners. 6. Always consider compromise. Remember, your partners view of reality may be just as real as yours, even though you may differ. There are not many totally objective realities. 7. Do not allow counterdemands to enter the picture until the original demands are clearly understood, and there has been a clear-cut response to them.   8. Never assume that you know what your partner is thinking until you have checked out the assumption in plain language; or assume or predict how he (or she) will react, what he (or she) will accept or reject. Crystall-ball gazing is not for pairing. 9. \"Dont mind rape. \"  [place] correct a partners statement of his (or her) feeling. Do not tell a partner what he (or she) should know or feel. 10. Never put labels on a partner. Do not call him (or her) a coward, nor neurotic, nor a child. If you really believed that he(or she) was incompetent or suffered from some basic flaw, you probably would not be with him (or her). Do not make sweeping labeling judgments about his (or her) feelings, especially about whether or not they are real or important. 6. Sarcasm is dirty fighting. 12. Forget the past and stay with the here and now. What either of you did last year or month or this morning is not as important as what you are doing and feeling right now. The changes you ask cannot possibly be retroactive. Hurts, grievances, and irritations should be brought up at the very earliest moment, or the partner has the right to suspect they may have been saved carefully as weapons. 13. Do not overload your partner with grievances. To do so makes him (or her) feel hopeless and suggests that you have either been hoarding complaints or have not thought through what really troubles you. 14. Meditate. Take time to consult your real thoughts and feelings before speaking. Your surface reaction may mask something deeper and more important. Don't be afraid to close your eyes and think. 13. Remember that there is never a single winner in an honest fight. Both either win or lose more intimacy.

## 2021-10-13 ENCOUNTER — OFFICE VISIT (OUTPATIENT)
Dept: PSYCHOLOGY | Age: 36
End: 2021-10-13
Payer: MEDICAID

## 2021-10-13 DIAGNOSIS — F90.2 ATTENTION DEFICIT HYPERACTIVITY DISORDER, COMBINED TYPE, MODERATE: ICD-10-CM

## 2021-10-13 DIAGNOSIS — F43.23 ADJUSTMENT DISORDER WITH MIXED ANXIETY AND DEPRESSED MOOD: Primary | ICD-10-CM

## 2021-10-13 PROCEDURE — 90832 PSYTX W PT 30 MINUTES: CPT | Performed by: PSYCHOLOGIST

## 2021-10-13 PROCEDURE — 1036F TOBACCO NON-USER: CPT | Performed by: PSYCHOLOGIST

## 2021-10-13 NOTE — PROGRESS NOTES
Behavioral Health Consultation  Ana Leone, Ph.D.  Psychologist  10/13/2021  2:00 PM EDT      Time spent with Patient: 30 minutes  This is patient's third [facility] appointment. Reason for Consult: anxious, worried, some anger  Referring Provider: Adams Desai, [address] [facility] [address]    Feedback for PCP:     Pt provided informed consent for the behavioral health program. Discussed with patient model of service to include the limits of confidentiality (i.e. abuse reporting, suicide intervention, etc.) and short-term intervention focused approach. Pt indicated understanding. Feedback given to PCP. S:  Patient reports that he \"beats myself up\" for not being able to do certain things at certain times. He says he has a list of home projects that he often feels stymied by, and can't get himself to get started. 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 He seems as though these can produce feelings of low perceived competence that can easily lead to dysthymia. Social History     Tobacco Use    Smoking status: Former Smoker     Packs/day: 0.25     Types: Cigarettes    Smokeless tobacco: Never Used    Tobacco comment: may be 1/4 pack a day and some days not smoke at all.    Substance Use Topics    Alcohol use: Yes     Comment: 2 times a week and 2 beers        Illicit drugs:   Social History     Substance and Sexual Activity   Drug Use Yes    Types: Marijuana        O:  MSE:  Appearance: good hygiene   Attitude: cooperative and friendly  Consciousness: alert  Orientation: oriented to person, place, time, general circumstance  Memory: recent and remote memory intact  Attention/Concentration: intact during session  Psychomotor Activity: normal  Eye Contact: normal  Speech: normal rate and volume, well-articulated  Mood: anxious  Affect: congruent  Perception: within normal limits  Thought Content: within normal limits  Thought Process: logical, coherent and goal-directed  Insight: good  Judgment: intact  Ability to understand instructions: Yes  Ability to respond meaningfully: Yes  Morbid Ideation: no   Suicide Assessment: no suicidal ideation, plan, or intent  Homicidal Ideation: no    A:  We talked about strategies to help with executive function deficits with chunking his projects. We talked about ways to find dedicated time for him to devote to music. 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 In general, we're talking about strategies to help raise his perceived competence,which will improve his mood. WILDA 7 SCORE 9/29/2021 9/15/2021   WILDA-7 Total Score 15 18     Interpretation of WILDA-7 score: 5-9 = mild anxiety, 10-14 = moderate anxiety, 15+ = severe anxiety. Recommend referral to behavioral health for scores 10 or greater. PHQ Scores 9/29/2021 9/15/2021 1/12/2021 4/3/2019   PHQ2 Score 2 6 3 0   PHQ9 Score 2 20 18 0     Interpretation of Total Score Depression Severity: 1-4 = Minimal depression, 5-9 = Mild depression, 10-14 = Moderate depression, 15-19 = Moderately severe depression, 20-27 = Severe depression    Diagnosis:    1. Adjustment disorder with mixed anxiety and depressed mood    2. Attention deficit hyperactivity disorder, combined type, moderate        Patient Active Problem List   Diagnosis    Reactive airway disease without complication    Greater trochanteric bursitis of left hip    Multiple lipomas    Snoring    Apnea spell    Adjustment disorder         Plan:  Pt interventions:  Established rapport, [place]-setting to identify pt's primary goals for [facility] visit / overall health, Supportive techniques, Provided Psychoeducation re: healthy communication patterns and Provided handout on Adult ADHD RS-IV assessment.        Pt Behavioral Change Plan:  Pt set the following goals:  Pt scheduled F/U visit in two weeks  See section 'A'

## 2021-10-14 ENCOUNTER — NURSE ONLY (OUTPATIENT)
Dept: INTERNAL MEDICINE CLINIC | Age: 36
End: 2021-10-14
Payer: MEDICAID

## 2021-10-14 PROCEDURE — 90471 IMMUNIZATION ADMIN: CPT | Performed by: INTERNAL MEDICINE

## 2021-10-14 PROCEDURE — 90674 CCIIV4 VAC NO PRSV 0.5 ML IM: CPT | Performed by: INTERNAL MEDICINE

## 2021-10-27 ENCOUNTER — OFFICE VISIT (OUTPATIENT)
Dept: PSYCHOLOGY | Age: 36
End: 2021-10-27
Payer: MEDICAID

## 2021-10-27 DIAGNOSIS — F34.1 PERSISTENT DEPRESSIVE DISORDER WITH ANXIOUS DISTRESS, CURRENTLY SEVERE: Primary | ICD-10-CM

## 2021-10-27 DIAGNOSIS — Z63.8 PARENTING STRESS: ICD-10-CM

## 2021-10-27 PROCEDURE — 90832 PSYTX W PT 30 MINUTES: CPT | Performed by: PSYCHOLOGIST

## 2021-10-27 PROCEDURE — 1036F TOBACCO NON-USER: CPT | Performed by: PSYCHOLOGIST

## 2021-10-27 SDOH — SOCIAL STABILITY - SOCIAL INSECURITY: OTHER SPECIFIED PROBLEMS RELATED TO PRIMARY SUPPORT GROUP: Z63.8

## 2021-10-27 ASSESSMENT — PATIENT HEALTH QUESTIONNAIRE - PHQ9
2. FEELING DOWN, DEPRESSED OR HOPELESS: 2
3. TROUBLE FALLING OR STAYING ASLEEP: 1
6. FEELING BAD ABOUT YOURSELF - OR THAT YOU ARE A FAILURE OR HAVE LET YOURSELF OR YOUR FAMILY DOWN: 2
SUM OF ALL RESPONSES TO PHQ QUESTIONS 1-9: 14
1. LITTLE INTEREST OR PLEASURE IN DOING THINGS: 2
DEPRESSION UNABLE TO ASSESS: PT REFUSES
SUM OF ALL RESPONSES TO PHQ9 QUESTIONS 1 & 2: 4
9. THOUGHTS THAT YOU WOULD BE BETTER OFF DEAD, OR OF HURTING YOURSELF: 1
5. POOR APPETITE OR OVEREATING: 2
SUM OF ALL RESPONSES TO PHQ QUESTIONS 1-9: 14
7. TROUBLE CONCENTRATING ON THINGS, SUCH AS READING THE NEWSPAPER OR WATCHING TELEVISION: 2
SUM OF ALL RESPONSES TO PHQ QUESTIONS 1-9: 13
4. FEELING TIRED OR HAVING LITTLE ENERGY: 2
8. MOVING OR SPEAKING SO SLOWLY THAT OTHER PEOPLE COULD HAVE NOTICED. OR THE OPPOSITE, BEING SO FIGETY OR RESTLESS THAT YOU HAVE BEEN MOVING AROUND A LOT MORE THAN USUAL: 0

## 2021-10-27 ASSESSMENT — ANXIETY QUESTIONNAIRES
6. BECOMING EASILY ANNOYED OR IRRITABLE: 2-OVER HALF THE DAYS
3. WORRYING TOO MUCH ABOUT DIFFERENT THINGS: 2-OVER HALF THE DAYS
2. NOT BEING ABLE TO STOP OR CONTROL WORRYING: 2-OVER HALF THE DAYS
7. FEELING AFRAID AS IF SOMETHING AWFUL MIGHT HAPPEN: 2-OVER HALF THE DAYS
GAD7 TOTAL SCORE: 13
5. BEING SO RESTLESS THAT IT IS HARD TO SIT STILL: 1-SEVERAL DAYS
4. TROUBLE RELAXING: 2-OVER HALF THE DAYS
1. FEELING NERVOUS, ANXIOUS, OR ON EDGE: 2-OVER HALF THE DAYS

## 2021-10-27 NOTE — PROGRESS NOTES
Behavioral Health Consultation  Gloria Partida, Ph.D.  Psychologist  10/27/2021  2:00 PM EDT      Time spent with Patient: 30 minutes  This is [facility]'s [facility] [place]. Reason for Consult: anxious, worried, some anger  Referring Provider: Christina Leonardo, [address] [facility] [address]    Feedback for PCP:     Pt provided informed consent for the behavioral health program. Discussed with patient model of service to include the limits of confidentiality (i.e. abuse reporting, suicide intervention, etc.) and short-term intervention focused approach. Pt indicated understanding. Feedback given to PCP. S:  Patient reports frustration and sadness with his parenting style. He feels bad when he loses his temper with his son Tereso Partida who is 6years old . He said that his intention is to be a good dad, but his good intentions d not seem to be enough. She said that the dynamic with his wife Adela Elizabeth reminds him of the dynamic he felt when he was growing up, where his desire for clarity and \"to get it right\" I met with impatience him feeling like she's saying, \"Duh you moron. \" He talked more about low perceived competence in the things that are the most important to him, and with his wife is seems to be stymied by conflicting communication styles where neither gets any andres from the other, and his parenting approach is inconsistent and frustrating. Many of his challenges seem to stem from deficits in executive functions. Social History     Tobacco Use    Smoking status: Former Smoker     Packs/day: 0.25     Types: Cigarettes    Smokeless tobacco: Never Used    Tobacco comment: may be 1/4 pack a day and some days not smoke at all.    Substance Use Topics    Alcohol use: Yes     Comment: 2 times a week and 2 beers        Illicit drugs:   Social History     Substance and Sexual Activity   Drug Use Yes    Types: Marijuana        O:  MSE:  Appearance: good hygiene   Attitude: cooperative and friendly  Consciousness: alert  Orientation: oriented to person, place, time, general circumstance  Memory: recent and remote memory intact  Attention/Concentration: intact during session  Psychomotor Activity: normal  Eye Contact: normal  Speech: normal rate and volume, well-articulated  Mood: anxious, depressed  Affect: congruent  Perception: within normal limits  Thought Content: within normal limits  Thought Process: logical, coherent and goal-directed  Insight: good  Judgment: intact  Ability to understand instructions: Yes  Ability to respond meaningfully: Yes  Morbid Ideation: no   Suicide Assessment: no suicidal ideation, plan, or intent  Homicidal Ideation: no    A:  We talked about parenting ideas to help him become more consistent with his son, like if he wants his son to do a task, don't make it an \"ask\" and be clear that it's a \"have to. \" We also talked about structuring his approach to parenting to improve his son's compliance. We talked about how it seems as though most of his depression comes from his perceived failure to effectively parent, that these skills can be \"coached. \"    WILDA 7 SCORE 10/27/2021 9/29/2021 9/15/2021   WILDA-7 Total Score 13 15 18     Interpretation of WILDA-7 score: 5-9 = mild anxiety, 10-14 = moderate anxiety, 15+ = severe anxiety. Recommend referral to behavioral health for scores 10 or greater. PHQ Scores 10/27/2021 9/29/2021 9/15/2021 1/12/2021 4/3/2019   PHQ2 Score 4 2 6 3 0   PHQ9 Score 14 2 20 18 0     Interpretation of Total Score Depression Severity: 1-4 = Minimal depression, 5-9 = Mild depression, 10-14 = Moderate depression, 15-19 = Moderately severe depression, 20-27 = Severe depression    Diagnosis:    1. Persistent depressive disorder with anxious distress, currently severe    2.  Parenting stress        Patient Active Problem List   Diagnosis    Reactive airway disease without complication    Greater trochanteric bursitis of left hip    Multiple lipomas    Snoring    Apnea spell    Adjustment disorder         Plan:  Pt interventions:  Established rapport, [place]-setting to identify pt's primary goals for [facility] visit / overall health, Supportive techniques, Provided Psychoeducation re: healthy communication patterns and Provided handout on Adult ADHD RS-IV assessment.        Pt Behavioral Change Plan:  Pt set the following goals:  Pt scheduled F/U visit in two weeks  See section 'A'

## 2021-12-01 ENCOUNTER — OFFICE VISIT (OUTPATIENT)
Dept: PSYCHOLOGY | Age: 36
End: 2021-12-01
Payer: MEDICAID

## 2021-12-01 DIAGNOSIS — F43.23 ADJUSTMENT DISORDER WITH MIXED ANXIETY AND DEPRESSED MOOD: Primary | ICD-10-CM

## 2021-12-01 DIAGNOSIS — F90.2 ATTENTION DEFICIT HYPERACTIVITY DISORDER, COMBINED TYPE, MODERATE: ICD-10-CM

## 2021-12-01 PROCEDURE — 90832 PSYTX W PT 30 MINUTES: CPT | Performed by: PSYCHOLOGIST

## 2021-12-01 PROCEDURE — 1036F TOBACCO NON-USER: CPT | Performed by: PSYCHOLOGIST

## 2021-12-01 ASSESSMENT — ANXIETY QUESTIONNAIRES
3. WORRYING TOO MUCH ABOUT DIFFERENT THINGS: 2-OVER HALF THE DAYS
7. FEELING AFRAID AS IF SOMETHING AWFUL MIGHT HAPPEN: 2-OVER HALF THE DAYS
6. BECOMING EASILY ANNOYED OR IRRITABLE: 3-NEARLY EVERY DAY
5. BEING SO RESTLESS THAT IT IS HARD TO SIT STILL: 2-OVER HALF THE DAYS
1. FEELING NERVOUS, ANXIOUS, OR ON EDGE: 2-OVER HALF THE DAYS
2. NOT BEING ABLE TO STOP OR CONTROL WORRYING: 2-OVER HALF THE DAYS
4. TROUBLE RELAXING: 2-OVER HALF THE DAYS
GAD7 TOTAL SCORE: 15

## 2021-12-01 ASSESSMENT — PATIENT HEALTH QUESTIONNAIRE - PHQ9
SUM OF ALL RESPONSES TO PHQ QUESTIONS 1-9: 1
1. LITTLE INTEREST OR PLEASURE IN DOING THINGS: 1
SUM OF ALL RESPONSES TO PHQ QUESTIONS 1-9: 1
SUM OF ALL RESPONSES TO PHQ QUESTIONS 1-9: 1

## 2021-12-01 NOTE — PROGRESS NOTES
Behavioral Health Consultation  Nury Holland, Ph.D.  Psychologist  12/1/2021  10:00 AM EDT      Time spent with Patient: 30 minutes  This is patient's fifth [facility] appointment. Reason for Consult: anxious, worried, some anger  Referring Provider: Chyna Parker, [address] [facility] [id number]    Feedback for PCP:     Pt provided informed consent for the behavioral health program. Discussed with patient model of service to include the limits of confidentiality (i.e. abuse reporting, suicide intervention, etc.) and short-term intervention focused approach. Pt indicated understanding. Feedback given to PCP. S:  Patient reports that the whole family contracted Covid but all were asymptomatic. He said that he feels as though he's being more mindful when parenting his 6year old, and had some insight about how his loud tone of voice may be interpreted by his son as being angry when he's not. He talked about an interaction with Antonio freitas SO where he assumed that she was being critical of him, and so he reacted defensively, when in fact she was not and couldn't understand what he was talking about. He said that they got it straightened out without a fight, but that particular interaction seemed like a pattern to the patient. Social History     Tobacco Use    Smoking status: Former Smoker     Packs/day: 0.25     Types: Cigarettes    Smokeless tobacco: Never Used    Tobacco comment: may be 1/4 pack a day and some days not smoke at all.    Substance Use Topics    Alcohol use: Yes     Comment: 2 times a week and 2 beers        Illicit drugs:   Social History     Substance and Sexual Activity   Drug Use Yes    Types: Marijuana Hulon Santiago)        O:  MSE:  Appearance: good hygiene   Attitude: cooperative and friendly  Consciousness: alert  Orientation: oriented to person, place, time, general circumstance  Memory: recent and remote memory intact  Attention/Concentration: intact during session  Psychomotor Activity: normal  Eye Contact: normal  Speech: normal rate and volume, well-articulated  Mood: anxious  Affect: congruent  Perception: within normal limits  Thought Content: within normal limits  Thought Process: logical, coherent and goal-directed  Insight: good  Judgment: intact  Ability to understand instructions: Yes  Ability to respond meaningfully: Yes  Morbid Ideation: no   Suicide Assessment: no suicidal ideation, plan, or intent  Homicidal Ideation: no    A:  We talked about how people with ADHD are used to being criticized or corrected as children, and then as adults, sometimes the \"I'm in trouble\"or \"i'm being criticized\" are default assumptions, which can inadvertently cause conflict. We talked about how it could be helpful to check out assumptions and even share this default habit with Helder Maya so she can understand some of his hard to understand reactions. We talked about Driven to Distraction as a helpful book to help explain some of the characteristics of adults with ADHD. WILDA 7 SCORE 12/1/2021 10/27/2021 9/29/2021 9/15/2021   WILDA-7 Total Score 15 13 15 18     Interpretation of WILDA-7 score: 5-9 = mild anxiety, 10-14 = moderate anxiety, 15+ = severe anxiety. Recommend referral to behavioral health for scores 10 or greater. PHQ Scores 12/1/2021 10/27/2021 9/29/2021 9/15/2021 1/12/2021 4/3/2019   PHQ2 Score - 4 2 6 3 0   PHQ9 Score 1 14 2 20 18 0     Interpretation of Total Score Depression Severity: 1-4 = Minimal depression, 5-9 = Mild depression, 10-14 = Moderate depression, 15-19 = Moderately severe depression, 20-27 = Severe depression    Diagnosis:    1. Adjustment disorder with mixed anxiety and depressed mood    2.  Attention deficit hyperactivity disorder, combined type, moderate        Patient Active Problem List   Diagnosis    Reactive airway disease without complication    Greater trochanteric bursitis of left hip    Multiple lipomas    Snoring    Apnea spell    Adjustment disorder         Plan:  Pt interventions:  Established rapport, Seminary-setting to identify pt's primary goals for [facility] visit / overall health, Supportive techniques, Provided Psychoeducation re: healthy communication patterns, Provided handout on Adult ADHD RS-IV assessment and Provided pt book recommendation.        Pt Behavioral Change Plan:  Pt set the following goals:  Pt scheduled F/U visit in two weeks  See section 'A'

## 2021-12-01 NOTE — PATIENT INSTRUCTIONS
The [address] [facility] Consultant giving you this message provides team-based care to treat the mind and body. He works directly with your doctor, who will always stay in charge of your care. Most [address] visits are 30 minutes or less. Usually, the [facility] provider and your doctor continue to see you until you are starting to do better and have a good plan in place for continued progress. Once that happens, most patients follow-up with just their doctor (though the [address] [place] [address] provider remains available to you as needed). If you are not improving, or if you desire outside mental health treatment, or if your doctor recommends more specialized help, we will be happy to help you find a mental health specialist in the community. Please also note your health insurance may be billed for [address] visits. Check with your insurance company, and tell the [facility] provider, if you have any questions about your [address] coverage.     Driven to Distraction by Candi Joseph with ADHD often are compared to 'normal' people or neurotypical   People with ADHD because of their wiring, are often criticized and told 'no' exponentially more than neurotypicals  So consequently, they have the \"I'm in trouble\" or \"I'm being criticized\" feeling> it can become their 'default' assumption and they may respond defensively < as though they are being criticized when they may not be    So recognize the default \"I'm in trouble' feeling, and then check it out-

## 2021-12-15 ENCOUNTER — OFFICE VISIT (OUTPATIENT)
Dept: PSYCHOLOGY | Age: 36
End: 2021-12-15
Payer: MEDICAID

## 2021-12-15 DIAGNOSIS — F43.23 ADJUSTMENT DISORDER WITH MIXED ANXIETY AND DEPRESSED MOOD: Primary | ICD-10-CM

## 2021-12-15 DIAGNOSIS — F90.2 ATTENTION DEFICIT HYPERACTIVITY DISORDER, COMBINED TYPE, MODERATE: ICD-10-CM

## 2021-12-15 PROCEDURE — 90832 PSYTX W PT 30 MINUTES: CPT | Performed by: PSYCHOLOGIST

## 2021-12-15 PROCEDURE — 1036F TOBACCO NON-USER: CPT | Performed by: PSYCHOLOGIST

## 2022-01-05 ENCOUNTER — OFFICE VISIT (OUTPATIENT)
Dept: PSYCHOLOGY | Age: 37
End: 2022-01-05
Payer: MEDICAID

## 2022-01-05 DIAGNOSIS — F90.2 ATTENTION DEFICIT HYPERACTIVITY DISORDER, COMBINED TYPE, MODERATE: ICD-10-CM

## 2022-01-05 DIAGNOSIS — F43.23 ADJUSTMENT DISORDER WITH MIXED ANXIETY AND DEPRESSED MOOD: Primary | ICD-10-CM

## 2022-01-05 PROCEDURE — 1036F TOBACCO NON-USER: CPT | Performed by: PSYCHOLOGIST

## 2022-01-05 PROCEDURE — 90832 PSYTX W PT 30 MINUTES: CPT | Performed by: PSYCHOLOGIST

## 2022-01-05 ASSESSMENT — PATIENT HEALTH QUESTIONNAIRE - PHQ9
9. THOUGHTS THAT YOU WOULD BE BETTER OFF DEAD, OR OF HURTING YOURSELF: 1
7. TROUBLE CONCENTRATING ON THINGS, SUCH AS READING THE NEWSPAPER OR WATCHING TELEVISION: 2
SUM OF ALL RESPONSES TO PHQ9 QUESTIONS 1 & 2: 3
4. FEELING TIRED OR HAVING LITTLE ENERGY: 1
SUM OF ALL RESPONSES TO PHQ QUESTIONS 1-9: 10
3. TROUBLE FALLING OR STAYING ASLEEP: 0
SUM OF ALL RESPONSES TO PHQ QUESTIONS 1-9: 9
SUM OF ALL RESPONSES TO PHQ QUESTIONS 1-9: 10
2. FEELING DOWN, DEPRESSED OR HOPELESS: 2
5. POOR APPETITE OR OVEREATING: 2
1. LITTLE INTEREST OR PLEASURE IN DOING THINGS: 1
SUM OF ALL RESPONSES TO PHQ QUESTIONS 1-9: 10
6. FEELING BAD ABOUT YOURSELF - OR THAT YOU ARE A FAILURE OR HAVE LET YOURSELF OR YOUR FAMILY DOWN: 1

## 2022-01-05 ASSESSMENT — ANXIETY QUESTIONNAIRES
6. BECOMING EASILY ANNOYED OR IRRITABLE: 1-SEVERAL DAYS
3. WORRYING TOO MUCH ABOUT DIFFERENT THINGS: 2-OVER HALF THE DAYS
4. TROUBLE RELAXING: 2-OVER HALF THE DAYS
2. NOT BEING ABLE TO STOP OR CONTROL WORRYING: 2-OVER HALF THE DAYS
1. FEELING NERVOUS, ANXIOUS, OR ON EDGE: 1-SEVERAL DAYS
7. FEELING AFRAID AS IF SOMETHING AWFUL MIGHT HAPPEN: 2-OVER HALF THE DAYS
GAD7 TOTAL SCORE: 12
5. BEING SO RESTLESS THAT IT IS HARD TO SIT STILL: 2-OVER HALF THE DAYS

## 2022-01-05 NOTE — Clinical Note
Thomas Epps- I heard you were under the weather so I hope you feel better soon! This monisha is coming your way towards the end of the month. 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 He's been treated for his ADHD in the past, and wants to consult with you about it now. He's developing some secondary dysphoria and avoidance patterns that are secondary to executive function deficits. He's not excited about medication but he's really bumping into limitations on his efficacy. Thanks!     Reshma Matos

## 2022-01-05 NOTE — PROGRESS NOTES
Behavioral Health Consultation  Trent Landa, Ph.D.  Psychologist  1/5/2022  10:00 AM EDT      Time spent with Patient: 30 minutes  This is patient's sixth [facility] appointment. Reason for Consult: anxious, worried, some anger  Referring Provider: Chloé Waters, [address] [facility] [address]    Feedback for PCP:     Pt provided informed consent for the behavioral health program. Discussed with patient model of service to include the limits of confidentiality (i.e. abuse reporting, suicide intervention, etc.) and short-term intervention focused approach. Pt indicated understanding. Feedback given to PCP. 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 He also said that in his marriage to Alejandra Francois, they seem to have settled into some emotional distance that he finds a little unsatisfying, as it probably reflects her emotional style more than his. He says that he feels as though he's making progress with parenting Lauren Limon his 6year-old son. He presents as more dysphoric than he has in previous visits. Social History     Tobacco Use    Smoking status: Former Smoker     Packs/day: 0.25     Types: Cigarettes    Smokeless tobacco: Never Used    Tobacco comment: may be 1/4 pack a day and some days not smoke at all.    Substance Use Topics    Alcohol use: Yes     Comment: 2 times a week and 2 beers        Illicit drugs:   Social History     Substance and Sexual Activity   Drug Use Yes    Types: Marijuana Fredick Copping)        O:  MSE:  Appearance: good hygiene   Attitude: cooperative and friendly  Consciousness: alert  Orientation: oriented to person, place, time, general circumstance  Memory: recent and remote memory intact  Attention/Concentration: intact during session  Psychomotor Activity: normal  Eye Contact: normal  Speech: normal rate and volume, well-articulated  Mood: less anxious  Affect: congruent  Perception: within normal limits  Thought Content: within normal limits  Thought Process: logical, coherent and goal-directed  Insight: good  Judgment: intact  Ability to understand instructions: Yes  Ability to respond meaningfully: Yes  Morbid Ideation: no   Suicide Assessment: no suicidal ideation, plan, or intent  Homicidal Ideation: no    A:  We talked about strategies to help with his executive function deficits, including using thought [place] bubbles 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 We talked about how avoidance makes these tasks scarier, and that deciding to call just one musician with a specific topic, will help him overcome his avoidance. We talked about having him have a med consult with LAUREN Steiner concerning his ADHD, as he is really bumping into the cognitive characteristics inherent in ADHD, and that he is developing dysphoria and avoidance secondary to ADHD. WILDA 7 SCORE 1/5/2022 12/1/2021 10/27/2021 9/29/2021 9/15/2021   WILDA-7 Total Score 12 15 13 15 18     Interpretation of WILDA-7 score: 5-9 = mild anxiety, 10-14 = moderate anxiety, 15+ = severe anxiety. Recommend referral to behavioral health for scores 10 or greater.     PHQ Scores 1/5/2022 12/1/2021 10/27/2021 9/29/2021 9/15/2021 1/12/2021 4/3/2019   PHQ2 Score 3 - 4 2 6 3 0   PHQ9 Score 10 1 14 2 20 18 0     Interpretation of Total Score Depression Severity: 1-4 = Minimal depression, 5-9 = Mild depression, 10-14 = Moderate depression, 15-19 = Moderately severe depression, 20-27 = Severe depression    Diagnosis:    No diagnosis found. Patient Active Problem List   Diagnosis    Reactive airway disease without complication    Greater trochanteric bursitis of left hip    Multiple lipomas    Snoring    Apnea spell    Adjustment disorder         Plan:  Pt interventions:  Supportive techniques, Provided Psychoeducation re: healthy communication patterns and Provided pt book recommendation.        Pt Behavioral Change Plan:  Pt set the following goals:  Pt scheduled F/U visit in two weeks  See section 'A'

## 2022-01-12 ENCOUNTER — OFFICE VISIT (OUTPATIENT)
Dept: INTERNAL MEDICINE CLINIC | Age: 37
End: 2022-01-12
Payer: MEDICAID

## 2022-01-12 VITALS
BODY MASS INDEX: 31.31 KG/M2 | TEMPERATURE: 97.1 F | HEART RATE: 84 BPM | OXYGEN SATURATION: 97 % | RESPIRATION RATE: 16 BRPM | SYSTOLIC BLOOD PRESSURE: 120 MMHG | DIASTOLIC BLOOD PRESSURE: 80 MMHG | WEIGHT: 264 LBS

## 2022-01-12 DIAGNOSIS — Z00.00 ANNUAL PHYSICAL EXAM: ICD-10-CM

## 2022-01-12 DIAGNOSIS — R06.83 SNORING: ICD-10-CM

## 2022-01-12 DIAGNOSIS — R06.81 APNEA SPELL: Primary | ICD-10-CM

## 2022-01-12 LAB
A/G RATIO: 2.4 (ref 1.1–2.2)
ALBUMIN SERPL-MCNC: 4.5 G/DL (ref 3.4–5)
ALP BLD-CCNC: 56 U/L (ref 40–129)
ALT SERPL-CCNC: 21 U/L (ref 10–40)
ANION GAP SERPL CALCULATED.3IONS-SCNC: 11 MMOL/L (ref 3–16)
AST SERPL-CCNC: 18 U/L (ref 15–37)
BASOPHILS ABSOLUTE: 0 K/UL (ref 0–0.2)
BASOPHILS RELATIVE PERCENT: 0.7 %
BILIRUB SERPL-MCNC: 0.5 MG/DL (ref 0–1)
BILIRUBIN, POC: NORMAL
BLOOD URINE, POC: NORMAL
BUN BLDV-MCNC: 16 MG/DL (ref 7–20)
CALCIUM SERPL-MCNC: 9.5 MG/DL (ref 8.3–10.6)
CHLORIDE BLD-SCNC: 102 MMOL/L (ref 99–110)
CLARITY, POC: CLEAR
CO2: 28 MMOL/L (ref 21–32)
COLOR, POC: NORMAL
CREAT SERPL-MCNC: 0.8 MG/DL (ref 0.9–1.3)
EOSINOPHILS ABSOLUTE: 0.2 K/UL (ref 0–0.6)
EOSINOPHILS RELATIVE PERCENT: 4.5 %
GFR AFRICAN AMERICAN: >60
GFR NON-AFRICAN AMERICAN: >60
GLUCOSE BLD-MCNC: 66 MG/DL (ref 70–99)
GLUCOSE URINE, POC: NORMAL
HCT VFR BLD CALC: 44.2 % (ref 40.5–52.5)
HEMOGLOBIN: 15.1 G/DL (ref 13.5–17.5)
KETONES, POC: NORMAL
LEUKOCYTE EST, POC: NORMAL
LYMPHOCYTES ABSOLUTE: 1.6 K/UL (ref 1–5.1)
LYMPHOCYTES RELATIVE PERCENT: 32.5 %
MCH RBC QN AUTO: 30 PG (ref 26–34)
MCHC RBC AUTO-ENTMCNC: 34.1 G/DL (ref 31–36)
MCV RBC AUTO: 88 FL (ref 80–100)
MONOCYTES ABSOLUTE: 0.4 K/UL (ref 0–1.3)
MONOCYTES RELATIVE PERCENT: 7.4 %
NEUTROPHILS ABSOLUTE: 2.7 K/UL (ref 1.7–7.7)
NEUTROPHILS RELATIVE PERCENT: 54.9 %
NITRITE, POC: NORMAL
PDW BLD-RTO: 13.1 % (ref 12.4–15.4)
PH, POC: 7
PLATELET # BLD: 180 K/UL (ref 135–450)
PMV BLD AUTO: 8.5 FL (ref 5–10.5)
POTASSIUM SERPL-SCNC: 4.6 MMOL/L (ref 3.5–5.1)
PROTEIN, POC: NORMAL
RBC # BLD: 5.02 M/UL (ref 4.2–5.9)
SODIUM BLD-SCNC: 141 MMOL/L (ref 136–145)
SPECIFIC GRAVITY, POC: 1.02
TOTAL PROTEIN: 6.4 G/DL (ref 6.4–8.2)
TSH SERPL DL<=0.05 MIU/L-ACNC: 1.65 UIU/ML (ref 0.27–4.2)
UROBILINOGEN, POC: 0.2
WBC # BLD: 4.8 K/UL (ref 4–11)

## 2022-01-12 PROCEDURE — 81002 URINALYSIS NONAUTO W/O SCOPE: CPT | Performed by: INTERNAL MEDICINE

## 2022-01-12 PROCEDURE — 99395 PREV VISIT EST AGE 18-39: CPT | Performed by: INTERNAL MEDICINE

## 2022-01-12 PROCEDURE — G8482 FLU IMMUNIZE ORDER/ADMIN: HCPCS | Performed by: INTERNAL MEDICINE

## 2022-01-12 PROCEDURE — 36415 COLL VENOUS BLD VENIPUNCTURE: CPT | Performed by: INTERNAL MEDICINE

## 2022-01-12 ASSESSMENT — ENCOUNTER SYMPTOMS
SHORTNESS OF BREATH: 0
GASTROINTESTINAL NEGATIVE: 1
TROUBLE SWALLOWING: 0
CHEST TIGHTNESS: 0
WHEEZING: 0
COUGH: 0

## 2022-01-12 NOTE — PROGRESS NOTES
2022    Kennon Callander (:  1985) is a 43 y.o. male, here for a preventive medicine evaluation. Feels well and has no new symptoms  His partner feels he snores a lot and stops breathing at nights and not all the time. Feels tired in am and not feel sleepy during day  Has no symptoms with activity. Has no cp gi or gu symptoms  Patient Active Problem List   Diagnosis    Reactive airway disease without complication    Greater trochanteric bursitis of left hip    Multiple lipomas    Snoring    Apnea spell    Adjustment disorder       Review of Systems   Constitutional: Positive for fatigue. Negative for activity change, appetite change and unexpected weight change. HENT: Negative for hearing loss and trouble swallowing. Eyes: Negative for visual disturbance. Respiratory: Negative for cough, chest tightness, shortness of breath and wheezing. Cardiovascular: Negative for chest pain, palpitations and leg swelling. Gastrointestinal: Negative. Endocrine: Negative. Genitourinary: Negative. Musculoskeletal: Negative. Neurological: Negative for dizziness, light-headedness, numbness and headaches. Hematological: Does not bruise/bleed easily. Psychiatric/Behavioral: Positive for sleep disturbance. Negative for dysphoric mood (at times and seeing Psychologist). The patient is not nervous/anxious.         Prior to Visit Medications    Not on File        No Known Allergies    Past Medical History:   Diagnosis Date    ADHD (attention deficit hyperactivity disorder)     ADD     Anxiety     Depression        Past Surgical History:   Procedure Laterality Date    KNEE SURGERY      right        Social History     Socioeconomic History    Marital status: Life Partner     Spouse name: Not on file    Number of children: Not on file    Years of education: Not on file    Highest education level: Not on file   Occupational History    Not on file   Tobacco Use    Smoking status: Former Smoker     Packs/day: 0.25     Years: 20.00     Pack years: 5.00     Types: Cigarettes     Quit date: 2021     Years since quittin.4    Smokeless tobacco: Never Used   Vaping Use    Vaping Use: Not on file   Substance and Sexual Activity    Alcohol use: Yes     Comment: 2 times a week and 2 beers    Drug use: Yes     Types: Marijuana [place])    Sexual activity: Not on file   Other Topics Concern    Not on file   Social History Narrative    Not on file     Social Determinants of Health     Financial Resource Strain: Low Risk     Difficulty of Paying Living Expenses: Not hard at all   Food Insecurity: No Food Insecurity    Worried About Running Out of Food in the Last Year: Never true    Yane of Food in the Last Year: Never true   Transportation Needs: No Transportation Needs    Lack of Transportation (Medical): No    Lack of Transportation (Non-Medical):  No   Physical Activity:     Days of Exercise per Week: Not on file    Minutes of Exercise per Session: Not on file   Stress:     Feeling of Stress : Not on file   Social Connections:     Frequency of Communication with Friends and Family: Not on file    Frequency of Social Gatherings with Friends and Family: Not on file    Attends Scientology Services: Not on file    Active Member of [address] [place] [address] [employer] or Organizations: Not on file    Attends Club or Organization Meetings: Not on file    Marital Status: Not on file   Intimate Partner Violence:     Fear of Current or Ex-Partner: Not on file    Emotionally Abused: Not on file    Physically Abused: Not on file    Sexually Abused: Not on file   Housing Stability:     Unable to Pay for Housing in the Last Year: Not on file    Number of [place] in the Last Year: Not on file    Unstable Housing in the Last Year: Not on file        Family History   Problem Relation Age of Onset    Arthritis Mother     Anemia Father     Arthritis Maternal Grandmother     Alcohol Abuse Maternal Grandfather     Stroke Maternal Grandfather     Alzheimer's Disease Paternal Grandmother        ADVANCE DIRECTIVE: N, <no information>    Vitals:    01/12/22 1015   BP: 124/82   Site: Right Upper Arm   Position: Sitting   Cuff Size: Large Adult   Pulse: 84   Resp: 16   Temp: 97.1 °F (36.2 °C)   SpO2: 97%   Weight: 264 lb (119.7 kg)     Estimated body mass index is 31.31 kg/m² as calculated from the following:    Height as of 1/12/21: 6' 5\" (1.956 m). Weight as of this encounter: 264 lb (119.7 kg). Physical Exam  Vitals and nursing note reviewed. Constitutional:       General: He is not in acute distress. Appearance: Normal appearance. HENT:      Head: Normocephalic. Right Ear: Tympanic membrane, ear canal and external ear normal. There is no impacted cerumen. Left Ear: Tympanic membrane, ear canal and external ear normal. There is no impacted cerumen. Nose: Nose normal. No congestion or rhinorrhea. Mouth/Throat:      Mouth: Mucous membranes are moist.      Pharynx: Oropharynx is clear. Eyes:      General: No scleral icterus. Extraocular Movements: Extraocular movements intact. Conjunctiva/sclera: Conjunctivae normal.      Pupils: Pupils are equal, round, and reactive to light. Neck:      Thyroid: No thyromegaly. Vascular: No carotid bruit or JVD. Cardiovascular:      Rate and Rhythm: Normal rate and regular rhythm. Pulses: Normal pulses. Heart sounds: Normal heart sounds. No murmur heard. No gallop. Pulmonary:      Effort: No respiratory distress. Breath sounds: Normal breath sounds. No wheezing, rhonchi or rales. Abdominal:      General: Abdomen is flat. Bowel sounds are normal. There is no distension. Palpations: Abdomen is soft. There is no hepatomegaly, splenomegaly or mass. Tenderness: There is no abdominal tenderness. Hernia: No hernia is present.    Genitourinary:     Penis: Normal.       Testes: Normal.   Musculoskeletal:      Right lower leg: No edema. Left lower leg: No edema. Comments: Has full rom hips and knees and has no swellign or tenderness. Lymphadenopathy:      Cervical: No cervical adenopathy. Neurological:      General: No focal deficit present. Mental Status: He is alert and oriented to person, place, and time. Psychiatric:         Mood and Affect: Mood normal.         No flowsheet data found. Lab Results   Component Value Date    CHOL 143 01/26/2021    TRIG 68 01/26/2021    HDL 48 01/26/2021    LDLCALC 81 01/26/2021    GLUCOSE 94 01/26/2021       The ASCVD Risk score ([address] et al., 2013) failed to calculate for the following reasons: The 2013 ASCVD risk score is only valid for ages 36 to 78    Immunization History   Administered Date(s) Administered    COVID-19, Pfizer, PF, 30mcg/0.3mL 04/03/2021, 04/27/2021    Influenza, MDCK Quadv, IM, PF (Flucelvax 2 yrs and older) 10/14/2021    Influenza, Quadv, IM, PF (6 mo and older Fluzone, Flulaval, Fluarix, and 3 yrs and older Afluria) 11/05/2019    Tdap (Boostrix, Adacel) 04/03/2019       Health Maintenance   Topic Date Due    Varicella vaccine (1 of 2 - 2-dose childhood series) Never done    COVID-19 Vaccine (3 - Booster for [place] series) 10/27/2021    Depression Monitoring  01/05/2023    DTaP/Tdap/Td vaccine (2 - Td or Tdap) 04/03/2029    Flu vaccine  Completed    Hepatitis C screen  Completed    HIV screen  Completed    Hepatitis A vaccine  Aged Out    Hepatitis B vaccine  Aged Out    Hib vaccine  Aged Out    Meningococcal (ACWY) vaccine  Aged Out    Pneumococcal 0-64 years Vaccine  Aged Out          ASSESSMENT/PLAN:  Normal exam   Has obesity and does exercise but not regualrly   possibly has sleep apnea      Plan  Will check labs  Advised he need regular exercise and need to loose weight  Will arrange for sleep test  An electronic signature was used to authenticate this note.     --Amada Farrell MD on 1/12/2022 at 10:25 AM

## 2022-01-12 NOTE — PATIENT INSTRUCTIONS
Will check labs  Advised he need regular exercise and need to loose weight  Will arrange for sleep test

## 2022-01-19 ENCOUNTER — OFFICE VISIT (OUTPATIENT)
Dept: PSYCHOLOGY | Age: 37
End: 2022-01-19
Payer: MEDICAID

## 2022-01-19 DIAGNOSIS — F90.2 ATTENTION DEFICIT HYPERACTIVITY DISORDER, COMBINED TYPE, MODERATE: ICD-10-CM

## 2022-01-19 DIAGNOSIS — F34.1 PERSISTENT DEPRESSIVE DISORDER WITH ANXIOUS DISTRESS, CURRENTLY MODERATE: Primary | ICD-10-CM

## 2022-01-19 PROCEDURE — 1036F TOBACCO NON-USER: CPT | Performed by: PSYCHOLOGIST

## 2022-01-19 PROCEDURE — 90832 PSYTX W PT 30 MINUTES: CPT | Performed by: PSYCHOLOGIST

## 2022-01-19 ASSESSMENT — ANXIETY QUESTIONNAIRES
5. BEING SO RESTLESS THAT IT IS HARD TO SIT STILL: 1-SEVERAL DAYS
2. NOT BEING ABLE TO STOP OR CONTROL WORRYING: 1-SEVERAL DAYS
6. BECOMING EASILY ANNOYED OR IRRITABLE: 2-OVER HALF THE DAYS
3. WORRYING TOO MUCH ABOUT DIFFERENT THINGS: 1-SEVERAL DAYS
7. FEELING AFRAID AS IF SOMETHING AWFUL MIGHT HAPPEN: 1-SEVERAL DAYS
GAD7 TOTAL SCORE: 9
4. TROUBLE RELAXING: 1-SEVERAL DAYS
1. FEELING NERVOUS, ANXIOUS, OR ON EDGE: 2-OVER HALF THE DAYS

## 2022-01-19 ASSESSMENT — PATIENT HEALTH QUESTIONNAIRE - PHQ9
SUM OF ALL RESPONSES TO PHQ9 QUESTIONS 1 & 2: 2
1. LITTLE INTEREST OR PLEASURE IN DOING THINGS: 1
SUM OF ALL RESPONSES TO PHQ QUESTIONS 1-9: 2
2. FEELING DOWN, DEPRESSED OR HOPELESS: 1

## 2022-01-19 NOTE — PROGRESS NOTES
Behavioral Health Consultation  Cr Jung, Ph.D.  Psychologist  2022  10:00 AM EDT      Time spent with Patient: 30 minutes  This is patient's sixth [facility] appointment. Reason for Consult: anxious, worried, some anger  Referring Provider: Lewis Dumas, [address] [facility] [address]    Feedback for PCP:     Pt provided informed consent for the behavioral health program. Discussed with patient model of service to include the limits of confidentiality (i.e. abuse reporting, suicide intervention, etc.) and short-term intervention focused approach. Pt indicated understanding. Feedback given to PCP. 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 He said that he also made a 'mind map' when he got home and was able to list elements of an action plan for each area he wants to work on. He said, \"I'm doing pretty alright. 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 He said, Miquel Montalvo are those other musicians being so damn productive? \" He said that his goal is to feel more confident, more of the time. \" He said that he read Ro Rios' book, Be Here Now when he was younger and for a while \"it was like my Bible. \"         Social History     Tobacco Use    Smoking status: Former Smoker     Packs/day: 0.25     Years: 20.00     Pack years: 5.00     Types: Cigarettes     Quit date: 2021     Years since quittin.4    Smokeless tobacco: Never Used   Substance Use Topics    Alcohol use: Yes     Comment: 2 times a week and 2 beers        Illicit drugs:   Social History     Substance and Sexual Activity   Drug Use Yes    Types: Marijuana [place])        O:  MSE:  Appearance: good hygiene   Attitude: cooperative and friendly  Consciousness: alert  Orientation: oriented to person, place, time, general PHQ9 Score 2 10 1 14 2 20 18     Interpretation of Total Score Depression Severity: 1-4 = Minimal depression, 5-9 = Mild depression, 10-14 = Moderate depression, 15-19 = Moderately severe depression, 20-27 = Severe depression    Diagnosis:    1. Persistent depressive disorder with anxious distress, currently moderate    2. Attention deficit hyperactivity disorder, combined type, moderate        Patient Active Problem List   Diagnosis    Reactive airway disease without complication    Greater trochanteric bursitis of left hip    Multiple lipomas    Snoring    Apnea spell    Adjustment disorder    Annual physical exam         Plan:  Pt interventions:  Supportive techniques, Provided Psychoeducation re: healthy communication patterns and Provided pt book recommendation.        Pt Behavioral Change Plan:  Pt set the following goals:  Pt scheduled F/U visit in two weeks  See section 'A'

## 2022-01-27 ENCOUNTER — OFFICE VISIT (OUTPATIENT)
Dept: PSYCHIATRY | Age: 37
End: 2022-01-27
Payer: MEDICAID

## 2022-01-27 DIAGNOSIS — F90.2 ADHD (ATTENTION DEFICIT HYPERACTIVITY DISORDER), COMBINED TYPE: Primary | ICD-10-CM

## 2022-01-27 PROCEDURE — G8417 CALC BMI ABV UP PARAM F/U: HCPCS | Performed by: NURSE PRACTITIONER

## 2022-01-27 PROCEDURE — G8427 DOCREV CUR MEDS BY ELIG CLIN: HCPCS | Performed by: NURSE PRACTITIONER

## 2022-01-27 PROCEDURE — G8482 FLU IMMUNIZE ORDER/ADMIN: HCPCS | Performed by: NURSE PRACTITIONER

## 2022-01-27 PROCEDURE — 1036F TOBACCO NON-USER: CPT | Performed by: NURSE PRACTITIONER

## 2022-01-27 PROCEDURE — 99205 OFFICE O/P NEW HI 60 MIN: CPT | Performed by: NURSE PRACTITIONER

## 2022-01-27 RX ORDER — DEXTROAMPHETAMINE SACCHARATE, AMPHETAMINE ASPARTATE MONOHYDRATE, DEXTROAMPHETAMINE SULFATE AND AMPHETAMINE SULFATE 3.75; 3.75; 3.75; 3.75 MG/1; MG/1; MG/1; MG/1
15 CAPSULE, EXTENDED RELEASE ORAL EVERY MORNING
Qty: 10 CAPSULE | Refills: 0 | Status: SHIPPED | OUTPATIENT
Start: 2022-01-27 | End: 2022-02-14

## 2022-01-27 ASSESSMENT — ANXIETY QUESTIONNAIRES
2. NOT BEING ABLE TO STOP OR CONTROL WORRYING: 1-SEVERAL DAYS
GAD7 TOTAL SCORE: 9
4. TROUBLE RELAXING: 1-SEVERAL DAYS
6. BECOMING EASILY ANNOYED OR IRRITABLE: 2-OVER HALF THE DAYS
7. FEELING AFRAID AS IF SOMETHING AWFUL MIGHT HAPPEN: 2-OVER HALF THE DAYS
1. FEELING NERVOUS, ANXIOUS, OR ON EDGE: 2-OVER HALF THE DAYS
5. BEING SO RESTLESS THAT IT IS HARD TO SIT STILL: 0-NOT AT ALL
3. WORRYING TOO MUCH ABOUT DIFFERENT THINGS: 1-SEVERAL DAYS

## 2022-01-27 ASSESSMENT — PATIENT HEALTH QUESTIONNAIRE - PHQ9
SUM OF ALL RESPONSES TO PHQ QUESTIONS 1-9: 12
6. FEELING BAD ABOUT YOURSELF - OR THAT YOU ARE A FAILURE OR HAVE LET YOURSELF OR YOUR FAMILY DOWN: 2
SUM OF ALL RESPONSES TO PHQ9 QUESTIONS 1 & 2: 4
1. LITTLE INTEREST OR PLEASURE IN DOING THINGS: 2
3. TROUBLE FALLING OR STAYING ASLEEP: 0
4. FEELING TIRED OR HAVING LITTLE ENERGY: 2
9. THOUGHTS THAT YOU WOULD BE BETTER OFF DEAD, OR OF HURTING YOURSELF: 1
5. POOR APPETITE OR OVEREATING: 2
7. TROUBLE CONCENTRATING ON THINGS, SUCH AS READING THE NEWSPAPER OR WATCHING TELEVISION: 2
8. MOVING OR SPEAKING SO SLOWLY THAT OTHER PEOPLE COULD HAVE NOTICED. OR THE OPPOSITE, BEING SO FIGETY OR RESTLESS THAT YOU HAVE BEEN MOVING AROUND A LOT MORE THAN USUAL: 0
SUM OF ALL RESPONSES TO PHQ QUESTIONS 1-9: 13
SUM OF ALL RESPONSES TO PHQ QUESTIONS 1-9: 13
10. IF YOU CHECKED OFF ANY PROBLEMS, HOW DIFFICULT HAVE THESE PROBLEMS MADE IT FOR YOU TO DO YOUR WORK, TAKE CARE OF THINGS AT HOME, OR GET ALONG WITH OTHER PEOPLE: 1
SUM OF ALL RESPONSES TO PHQ QUESTIONS 1-9: 13
2. FEELING DOWN, DEPRESSED OR HOPELESS: 2
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
## 2022-02-02 ENCOUNTER — OFFICE VISIT (OUTPATIENT)
Dept: PSYCHOLOGY | Age: 37
End: 2022-02-02
Payer: MEDICAID

## 2022-02-02 DIAGNOSIS — F90.2 ATTENTION DEFICIT HYPERACTIVITY DISORDER, COMBINED TYPE, MODERATE: Primary | ICD-10-CM

## 2022-02-02 DIAGNOSIS — F34.1 PERSISTENT DEPRESSIVE DISORDER WITH ANXIOUS DISTRESS, CURRENTLY MODERATE: ICD-10-CM

## 2022-02-02 PROCEDURE — 1036F TOBACCO NON-USER: CPT | Performed by: PSYCHOLOGIST

## 2022-02-02 PROCEDURE — 90832 PSYTX W PT 30 MINUTES: CPT | Performed by: PSYCHOLOGIST

## 2022-02-02 ASSESSMENT — PATIENT HEALTH QUESTIONNAIRE - PHQ9
SUM OF ALL RESPONSES TO PHQ QUESTIONS 1-9: 2
SUM OF ALL RESPONSES TO PHQ9 QUESTIONS 1 & 2: 2
1. LITTLE INTEREST OR PLEASURE IN DOING THINGS: 1
SUM OF ALL RESPONSES TO PHQ QUESTIONS 1-9: 2
SUM OF ALL RESPONSES TO PHQ QUESTIONS 1-9: 2
2. FEELING DOWN, DEPRESSED OR HOPELESS: 1
SUM OF ALL RESPONSES TO PHQ QUESTIONS 1-9: 2

## 2022-02-02 ASSESSMENT — ANXIETY QUESTIONNAIRES
3. WORRYING TOO MUCH ABOUT DIFFERENT THINGS: 2-OVER HALF THE DAYS
7. FEELING AFRAID AS IF SOMETHING AWFUL MIGHT HAPPEN: 2-OVER HALF THE DAYS
6. BECOMING EASILY ANNOYED OR IRRITABLE: 2-OVER HALF THE DAYS
4. TROUBLE RELAXING: 2-OVER HALF THE DAYS
2. NOT BEING ABLE TO STOP OR CONTROL WORRYING: 2-OVER HALF THE DAYS
GAD7 TOTAL SCORE: 12
5. BEING SO RESTLESS THAT IT IS HARD TO SIT STILL: 1-SEVERAL DAYS
1. FEELING NERVOUS, ANXIOUS, OR ON EDGE: 1-SEVERAL DAYS

## 2022-02-02 NOTE — PROGRESS NOTES
Behavioral Health Consultation  Pastor Salazar, Ph.D.  Psychologist  2022  11:00 AM EDT      Time spent with Patient: 30 minutes  This is patient's ninth [facility] appointment. Reason for Consult: anxious, ADHD  Referring Provider: Cesar Klinefelter, [facility] [address]    Feedback for PCP:     Pt provided informed consent for the behavioral health program. Discussed with patient model of service to include the limits of confidentiality (i.e. abuse reporting, suicide intervention, etc.) and short-term intervention focused approach. Pt indicated understanding. Feedback given to PCP. S:  Patient reports that he started his medicine for Adderall at 15 mg and that he's taken it for three days. He said that he had a \"super scattered day\" on Monday and wondered if it was related to the medicine. He also had some questions about taking the med in general that we dicussed. He said that he has very productive day with his \"band mates\" and that they felt good about accomplishing a lot.          Social History     Tobacco Use    Smoking status: Former Smoker     Packs/day: 0.25     Years: 20.00     Pack years: 5.00     Types: Cigarettes     Quit date: 2021     Years since quittin.5    Smokeless tobacco: Never Used   Substance Use Topics    Alcohol use: Yes     Comment: 2 times a week and 2 beers        Illicit drugs:   Social History     Substance and Sexual Activity   Drug Use Yes    Types: Marijuana Thurl Cipro)        O:  MSE:  Appearance: good hygiene   Attitude: cooperative and friendly  Consciousness: alert  Orientation: oriented to person, place, time, general circumstance  Memory: recent and remote memory intact  Attention/Concentration: intact during session  Psychomotor Activity: normal  Eye Contact: normal  Speech: normal rate and volume, well-articulated  Mood: less anxious  Affect: congruent  Perception: within normal limits  Thought Content: within normal limits  Thought Process: logical, coherent and goal-directed  Insight: good  Judgment: intact  Ability to understand instructions: Yes  Ability to respond meaningfully: Yes  Morbid Ideation: no   Suicide Assessment: no suicidal ideation, plan, or intent  Homicidal Ideation: no    A:  We talked about his response to the meds and that at 15mg, it's not surprising that he's not sure if it's helping. He feels good about his decision to try it even though he feels cautious about it. He will finish his 10 day script and then communicate to his  for the next step. His affect and mood were positive. WILDA 7 SCORE 2/2/2022 1/27/2022 1/19/2022 1/5/2022 12/1/2021 10/27/2021 9/29/2021   WILDA-7 Total Score 12 9 9 12 15 13 15     Interpretation of WILDA-7 score: 5-9 = mild anxiety, 10-14 = moderate anxiety, 15+ = severe anxiety. Recommend referral to behavioral health for scores 10 or greater. PHQ Scores 2/2/2022 1/27/2022 1/19/2022 1/5/2022 12/1/2021 10/27/2021 9/29/2021   PHQ2 Score 2 4 2 3 - 4 2   PHQ9 Score 2 13 2 10 1 14 2     Interpretation of Total Score Depression Severity: 1-4 = Minimal depression, 5-9 = Mild depression, 10-14 = Moderate depression, 15-19 = Moderately severe depression, 20-27 = Severe depression    Diagnosis:    1. Attention deficit hyperactivity disorder, combined type, moderate    2. Persistent depressive disorder with anxious distress, currently moderate        Patient Active Problem List   Diagnosis    Reactive airway disease without complication    Greater trochanteric bursitis of left hip    Multiple lipomas    Snoring    Apnea spell    Adjustment disorder    Annual physical exam         Plan:  Pt interventions:  Supportive techniques, Provided Psychoeducation re: healthy communication patterns and Provided pt book recommendation.        Pt Behavioral Change Plan:  Pt set the following goals:  Pt scheduled F/U visit in two weeks  See section 'A'

## 2022-02-11 ENCOUNTER — TELEPHONE (OUTPATIENT)
Dept: INTERNAL MEDICINE CLINIC | Age: 37
End: 2022-02-11

## 2022-02-11 PROBLEM — Z00.00 ANNUAL PHYSICAL EXAM: Status: RESOLVED | Noted: 2022-01-12 | Resolved: 2022-02-11

## 2022-02-11 NOTE — TELEPHONE ENCOUNTER
He is calling to let you know that his 10 day supply , he feels he needs a higher dose of adderall. Not helping . He is on 15 mg a day.      Could you please call him back at [phone]

## 2022-02-14 DIAGNOSIS — F90.2 ADHD (ATTENTION DEFICIT HYPERACTIVITY DISORDER), COMBINED TYPE: Primary | ICD-10-CM

## 2022-02-14 RX ORDER — DEXTROAMPHETAMINE SACCHARATE, AMPHETAMINE ASPARTATE MONOHYDRATE, DEXTROAMPHETAMINE SULFATE AND AMPHETAMINE SULFATE 7.5; 7.5; 7.5; 7.5 MG/1; MG/1; MG/1; MG/1
30 CAPSULE, EXTENDED RELEASE ORAL DAILY
Qty: 14 CAPSULE | Refills: 0 | Status: SHIPPED | OUTPATIENT
Start: 2022-02-14 | End: 2022-03-08 | Stop reason: SDUPTHER

## 2022-02-15 ENCOUNTER — TELEPHONE (OUTPATIENT)
Dept: PSYCHIATRY | Age: 37
End: 2022-02-15

## 2022-02-15 NOTE — TELEPHONE ENCOUNTER
Called [facility] and notified will  rx Subjective-OB


Subjective: 


Post Delivery Day: 1








21 year old.  Denies any needs at this time,  states lochia is stable, pain is 

well controlled, voiding without difficulty.








Physical Exam (OB)


Vital Signs: 


 











Temp Pulse Resp BP Pulse Ox


 


 98.3 F   81   18   116/60   97 


 


 17 04:17  17 04:17  17 04:17  17 04:17  17 04:17








 Intake & Output











 03/08/17 03/09/17 03/10/17





 06:59 06:59 06:59


 


Weight  61.35 kg 














- PIH/Pre-Eclampsia


DTR's: 1 +


Clonus: Negative


Headache: Absent


Epigastric Pain: No


Visual Changes: No





- Lochia


Lochia Amount: Scant < 10 ml


Lochia Color: Rubra/Red





- Abdomen


Description: Soft, Round


Hernia Present: No


Fundal Description: Firm


Fundal Height: u/u - u/2





Objective-Diagnostic


Laboratory: 


 





 17 07:20 





 











  17





  15:20 15:37 15:37


 


WBC   10.7 H 


 


RBC   3.40 L 


 


Hgb   10.6 L 


 


Hct   31.0 L 


 


MCV   91 


 


MCH   31.0 


 


MCHC   34.1 


 


RDW   13.1 


 


Plt Count   225 


 


Seg Neutrophils %   65.1 


 


Lymphocytes %   21.7 


 


Monocytes %   12.4 


 


Eosinophils %   0.5 


 


Basophils %   0.3 


 


Absolute Neutrophils   7.0 


 


Absolute Lymphocytes   2.3 


 


Absolute Monocytes   1.3 


 


Absolute Eosinophils   0.1 


 


Absolute Basophils   0.0 


 


Urine Color  STRAW  


 


Urine Appearance  SLIGHTLY-CLOUDY  


 


Urine pH  7.0  


 


Ur Specific Gravity  1.003  


 


Urine Protein  NEGATIVE  


 


Urine Glucose (UA)  NEGATIVE  


 


Urine Ketones  NEGATIVE  


 


Urine Blood  SMALL H  


 


Urine Nitrite  NEGATIVE  


 


Ur Leukocyte Esterase  LARGE H  


 


Blood Type    O POSITIVE


 


Antibody Screen    NEGATIVE














  17





  07:20


 


WBC  19.3 H


 


RBC  3.36 L


 


Hgb  10.4 L


 


Hct  30.8 L


 


MCV  92


 


MCH  30.8


 


MCHC  33.6


 


RDW  13.3


 


Plt Count  203


 


Seg Neutrophils % 


 


Lymphocytes % 


 


Monocytes % 


 


Eosinophils % 


 


Basophils % 


 


Absolute Neutrophils 


 


Absolute Lymphocytes 


 


Absolute Monocytes 


 


Absolute Eosinophils 


 


Absolute Basophils 


 


Urine Color 


 


Urine Appearance 


 


Urine pH 


 


Ur Specific Gravity 


 


Urine Protein 


 


Urine Glucose (UA) 


 


Urine Ketones 


 


Urine Blood 


 


Urine Nitrite 


 


Ur Leukocyte Esterase 


 


Blood Type 


 


Antibody Screen 














Assessment and Plan(PN)





- Assessment and Plan


(1) GDM (gestational diabetes mellitus)


Qualifiers: 


     Gestational diabetes mellitus control: diet-controlled     Trimester: 

third trimester        Qualified Code(s): O24.410 - Gestational diabetes 

mellitus in pregnancy, diet controlled  


Is this a current diagnosis for this admission?: YesPlan: 


6w pp diet glucose check








(2)  (normal spontaneous vaginal delivery)


Is this a current diagnosis for this admission?: YesPlan: 


routine pp care











- Time Spent with Patient


Time with patient: Less than 15 minutes


Critical Time spent with patient: Less than 15 minutes


Medications reviewed and adjusted accordingly: Yes





- Disposition


Anticipated Discharge: Home


Within: within 24 hours

## 2022-02-15 NOTE — TELEPHONE ENCOUNTER
Script for Adderall 30 mg ER caps needs a Prior Auth. Send PA to [facility].  Fax [phone]

## 2022-02-16 NOTE — TELEPHONE ENCOUNTER
This is a [facility] PA. This team only does PCP PA. If this requires a response please respond to the pool ( P [facility] [address]). Thank you please advise patient.

## 2022-02-18 NOTE — TELEPHONE ENCOUNTER
PA COVER MY MEDS  Medication:Amphetamine-Dextroamphet ER 30MG er capsules  Key:[id number]  Status:PENDING

## 2022-02-21 NOTE — TELEPHONE ENCOUNTER
PA APPROVAL  Amphetamine-Dextroamphet ER 30MG er capsules  PA # [id number]  Start-02/21/2022  End-02/21/2023

## 2022-03-08 DIAGNOSIS — F90.2 ADHD (ATTENTION DEFICIT HYPERACTIVITY DISORDER), COMBINED TYPE: ICD-10-CM

## 2022-03-08 RX ORDER — DEXTROAMPHETAMINE SACCHARATE, AMPHETAMINE ASPARTATE MONOHYDRATE, DEXTROAMPHETAMINE SULFATE AND AMPHETAMINE SULFATE 7.5; 7.5; 7.5; 7.5 MG/1; MG/1; MG/1; MG/1
30 CAPSULE, EXTENDED RELEASE ORAL DAILY
Qty: 30 CAPSULE | Refills: 0 | Status: SHIPPED | OUTPATIENT
Start: 2022-03-08 | End: 2022-04-15 | Stop reason: SDUPTHER

## 2022-03-08 NOTE — TELEPHONE ENCOUNTER
Last office visit : 01/27/2022    Future Appointments   Date Time Provider Parris Vargas   3/9/2022 11:00 AM Pepe Petersen PhD [place] [facility]   3/10/2022  1:30 PM BOBO Samuels - NP [facility]

## 2022-03-09 NOTE — PROGRESS NOTES
PSYCHIATRY PROGRESS NOTE    Ivana Quinonez  1985  03/10/22      CC:   Chief Complaint   Patient presents with    ADHD    Follow-up       HPI:   Ivana Quinonez is a 43 y.o. male with h/o ADHD who p/t clinic for follow up. Subjective/Interval Hx: Patient feels like he is doing really well with the medications. Feeling focused, completing tasks, finishing his tasks. Able to stay clear headed. Was able to do some home renovations before work shifts. Overall, feeling some resolve with ADHD symptoms. No side effects. Has issues with playing music at night. Will take Adderall later in day to try to help but then cannot sleep. Will add IR booster. Location:  [facility]  Severity: Mild  Context:  As above. Modifiers: Improved with medications  Quality: ADHD improving    ASRS Scores: 1/27/2022  ADHD screener results were positive. Inattentive:Part A - Total: 25  0-16 Unlikely  17-23 Likely  24+ Highly likely     Hyperactive/Impulsive: Part B - Total: 20  0-16 Unlikely  17-23 Likely  24+ Highly likely        Past Psychiatric History:               Prior hospitalizations:  Denies              Prior diagnoses: ADHD, Adjustment Disorder              Outpatient Treatment: See below                          Psychiatrist: Verónica                          Therapist: Dr. Chad Gusman              Suicide Attempts: Denies              Hx SH:  Denies     Past Psychopharmacologic Trials (including response/reactions):  Ritalin       WILDA 7 SCORE 3/10/2022 2/2/2022 1/27/2022 1/19/2022 1/5/2022 12/1/2021 10/27/2021   WILDA-7 Total Score 7 12 9 9 12 15 13     Interpretation of WILDA-7 score: 5-9 = mild anxiety, 10-14 = moderate anxiety,   15+ = severe anxiety. Recommend referral to behavioral health for scores 10 or greater.     PHQ-9 Total Score: 7 (3/10/2022  1:35 PM)  Thoughts that you would be better off dead, or of hurting yourself in some way: 0 (3/10/2022  1:35 PM)    Interpretation of PHQ-9 score:  1-4 = minimal depression, 5-9 = mild depression,   10-14 = moderate depression; 15-19 = moderately severe depression, 20-27 = severe depression    Past Medical/Surgical History:   Past Medical History:   Diagnosis Date    ADHD (attention deficit hyperactivity disorder)     ADD     Anxiety     Depression      Past Surgical History:   Procedure Laterality Date    KNEE SURGERY      right        Family History   Problem Relation Age of Onset    Arthritis Mother     Anemia Father     Arthritis Maternal Grandmother     Alcohol Abuse Maternal Grandfather     Stroke Maternal Grandfather     Alzheimer's Disease Paternal Grandmother          PCP: No primary care provider on file. Allergies: No Known Allergies      Current Medications:   Current Outpatient Medications on File Prior to Visit   Medication Sig Dispense Refill    amphetamine-dextroamphetamine (ADDERALL XR) 30 MG extended release capsule Take 1 capsule by mouth daily for 30 days. 30 capsule 0     No current facility-administered medications on file prior to visit. Controlled Substance Monitoring:  PDMP Monitoring:    Last PDMP Esequiel as Reviewed [facility]):  Review User Review Instant Review Result   FINESSE MACHUCA 3/10/2022  1:42 PM Reviewed PDMP [1]       Urine Drug Screenings (1 yr)    No resulted procedures found. Medication Contract and Consent for Opioid Use Documents Filed      No documents found                OBJECTIVE:  Vitals:    Wt Readings from Last 3 Encounters:   01/12/22 264 lb (119.7 kg)   01/12/21 259 lb (117.5 kg)   04/03/19 266 lb 12.8 oz (121 kg)     ROS: Denies trouble with fever, rash, headache, vision changes, chest pain, shortness of breath, nausea, extremity pain, weakness, dysuria    Mental Status Exam:      Appearance    alert, cooperative, appropriate dress for season, well groomed, appears stated age  Muscle strength/tone: no atrophy or abnormal movements  Gait/station: normal  Speech    spontaneous, normal rate and normal volume  Mood Anxious  Affect    normal affect Congruent to thought content and mood  Thought Content    Logical no delusions voiced  Thought Process    linear   Associations    logical connections  Perceptions: denies AH/VH, does not appear preoccupied with the internal environment  Insight    Good  Judgment    Intact  Orientation    oriented to person, place, time, and general circumstances  Memory    recent and remote memory intact  Attention/Concentration    intact  Ability to understand instructions Yes  Ability to respond meaningfully Yes  Fund of knowledge/Intellect: Average  SI:   no suicidal ideation  HI: Denies HI    Labs:     Office Visit on 01/12/2022   Component Date Value    Color, UA 01/12/2022 drk yellow     Clarity, UA 01/12/2022 clear     Glucose, UA POC 01/12/2022 N     Bilirubin, UA 01/12/2022 N     Ketones, UA 01/12/2022 N     Spec Grav, UA 01/12/2022 1.020     Blood, UA POC 01/12/2022 N     pH, UA 01/12/2022 7.0     Protein, UA POC 01/12/2022 N     Urobilinogen, UA 01/12/2022 0.2     Leukocytes, UA 01/12/2022 N     Nitrite, UA 01/12/2022 N     TSH 01/12/2022 1.65     Sodium 01/12/2022 141     Potassium 01/12/2022 4.6     Chloride 01/12/2022 102     CO2 01/12/2022 28     Anion Gap 01/12/2022 11     Glucose 01/12/2022 66*    BUN 01/12/2022 16     CREATININE 01/12/2022 0.8*    GFR Non- 01/12/2022 >60     GFR  01/12/2022 >60     Calcium 01/12/2022 9.5     Total Protein 01/12/2022 6.4     Albumin 01/12/2022 4.5     Albumin/Globulin Ratio 01/12/2022 2.4*    Total Bilirubin 01/12/2022 0.5     Alkaline Phosphatase 01/12/2022 56     ALT 01/12/2022 21     AST 01/12/2022 18     WBC 01/12/2022 4.8     RBC 01/12/2022 5.02     Hemoglobin 01/12/2022 15.1     Hematocrit 01/12/2022 44.2     MCV 01/12/2022 88.0     MCH 01/12/2022 30.0     MCHC 01/12/2022 34.1     RDW 01/12/2022 13.1     Platelets 67/40/3460 180     MPV 01/12/2022 8.5     Neutrophils % 01/12/2022 54.9     Lymphocytes % 01/12/2022 32.5     Monocytes % 01/12/2022 7.4     Eosinophils % 01/12/2022 4.5     Basophils % 01/12/2022 0.7     Neutrophils Absolute 01/12/2022 2.7     Lymphocytes Absolute 01/12/2022 1.6     Monocytes Absolute 01/12/2022 0.4     Eosinophils Absolute 01/12/2022 0.2     Basophils Absolute 01/12/2022 0.0        Last Drug screen:  No results found for: Oris Fresh, LABBENZ, COCAIMETSCRU, THC, MDMA, LABMETH, OPIATESCREENURINE, OXTCOSU, PHENCYCLIDINESCREENURINE, PROPOXYPHENE, METAMPU    Imaging: no head imaging on file      ASSESSMENT AND PLAN     Diagnosis Orders   1. ADHD (attention deficit hyperactivity disorder), combined type  amphetamine-dextroamphetamine (ADDERALL, 5MG,) 5 MG tablet    DRUG SCREEN MULTI URINE          1. Safety: NO Imminent risk of danger to/self/others based on the factors considered below. Appropriate for outpatient level of care. Safety plan includes: 911, PES, hotlines, and interventions discussed today.      Risk factors:male gender,no collateral information to support safety.     Protective factors: Age >25 and <55,denies suicidal ideation, does not have lethal plan, does not have access to guns or weapons, patient is jose for safety, no prior suicide attempts, no family h/o suicide, no substance abuse, patient has social or family support, no active psychosis or cognitive dysfunction, physically healthy, already has outpatient services in place, compliant with recommended medications, and patient is future oriented.        2. Psychiatric Plan     ADHD, combined type:     - Continue Adderall 30 mg XR once daily for management of ADHD symptoms. Med Contract Signed. UDS ordered for future, will be getting next week with Dr. Sylvester Lea visit No fam hx of cardiac events. Add Adderall IR 5 mg once daily for night time music gigs. XR keeps him awake too long.     -Labs: reviewed in Epic     -Recommend outpt therapy.   Continue with  [facility]      -OARRS reviewed, c/w history  -R/b/se/a d/w pt who consents.     3. Medical  -Following with Cesar Klinefelter, MD     4. Substance   -No active issues.     5. RTC - 4 weeks     [facility] CARINE Ruiz, DINESH  Psychiatric Mental Health Nurse Practitioner     On this date 3/10/2022 I have spent 25 minutes reviewing previous notes, test results and face to face with the patient discussing the diagnosis and importance of compliance with the treatment plan as well as documenting on the day of the visit.

## 2022-03-10 ENCOUNTER — OFFICE VISIT (OUTPATIENT)
Dept: PSYCHIATRY | Age: 37
End: 2022-03-10
Payer: MEDICAID

## 2022-03-10 DIAGNOSIS — F90.2 ADHD (ATTENTION DEFICIT HYPERACTIVITY DISORDER), COMBINED TYPE: Primary | ICD-10-CM

## 2022-03-10 PROCEDURE — 1036F TOBACCO NON-USER: CPT | Performed by: NURSE PRACTITIONER

## 2022-03-10 PROCEDURE — G8482 FLU IMMUNIZE ORDER/ADMIN: HCPCS | Performed by: NURSE PRACTITIONER

## 2022-03-10 PROCEDURE — 99213 OFFICE O/P EST LOW 20 MIN: CPT | Performed by: NURSE PRACTITIONER

## 2022-03-10 PROCEDURE — G8417 CALC BMI ABV UP PARAM F/U: HCPCS | Performed by: NURSE PRACTITIONER

## 2022-03-10 PROCEDURE — G8427 DOCREV CUR MEDS BY ELIG CLIN: HCPCS | Performed by: NURSE PRACTITIONER

## 2022-03-10 RX ORDER — DEXTROAMPHETAMINE SACCHARATE, AMPHETAMINE ASPARTATE, DEXTROAMPHETAMINE SULFATE AND AMPHETAMINE SULFATE 1.25; 1.25; 1.25; 1.25 MG/1; MG/1; MG/1; MG/1
5 TABLET ORAL DAILY
Qty: 27 TABLET | Refills: 0 | Status: SHIPPED | OUTPATIENT
Start: 2022-03-10 | End: 2022-04-15 | Stop reason: SDUPTHER

## 2022-03-10 ASSESSMENT — PATIENT HEALTH QUESTIONNAIRE - PHQ9
9. THOUGHTS THAT YOU WOULD BE BETTER OFF DEAD, OR OF HURTING YOURSELF: 0
SUM OF ALL RESPONSES TO PHQ9 QUESTIONS 1 & 2: 2
5. POOR APPETITE OR OVEREATING: 1
SUM OF ALL RESPONSES TO PHQ QUESTIONS 1-9: 7
3. TROUBLE FALLING OR STAYING ASLEEP: 1
SUM OF ALL RESPONSES TO PHQ QUESTIONS 1-9: 7
2. FEELING DOWN, DEPRESSED OR HOPELESS: 1
1. LITTLE INTEREST OR PLEASURE IN DOING THINGS: 1
SUM OF ALL RESPONSES TO PHQ QUESTIONS 1-9: 7
7. TROUBLE CONCENTRATING ON THINGS, SUCH AS READING THE NEWSPAPER OR WATCHING TELEVISION: 1
4. FEELING TIRED OR HAVING LITTLE ENERGY: 1
SUM OF ALL RESPONSES TO PHQ QUESTIONS 1-9: 7
6. FEELING BAD ABOUT YOURSELF - OR THAT YOU ARE A FAILURE OR HAVE LET YOURSELF OR YOUR FAMILY DOWN: 1
8. MOVING OR SPEAKING SO SLOWLY THAT OTHER PEOPLE COULD HAVE NOTICED. OR THE OPPOSITE, BEING SO FIGETY OR RESTLESS THAT YOU HAVE BEEN MOVING AROUND A LOT MORE THAN USUAL: 0
10. IF YOU CHECKED OFF ANY PROBLEMS, HOW DIFFICULT HAVE THESE PROBLEMS MADE IT FOR YOU TO DO YOUR WORK, TAKE CARE OF THINGS AT HOME, OR GET ALONG WITH OTHER PEOPLE: 1

## 2022-03-10 ASSESSMENT — ANXIETY QUESTIONNAIRES
1. FEELING NERVOUS, ANXIOUS, OR ON EDGE: 1-SEVERAL DAYS
4. TROUBLE RELAXING: 1-SEVERAL DAYS
GAD7 TOTAL SCORE: 7
5. BEING SO RESTLESS THAT IT IS HARD TO SIT STILL: 0-NOT AT ALL
2. NOT BEING ABLE TO STOP OR CONTROL WORRYING: 1-SEVERAL DAYS
7. FEELING AFRAID AS IF SOMETHING AWFUL MIGHT HAPPEN: 2-OVER HALF THE DAYS
3. WORRYING TOO MUCH ABOUT DIFFERENT THINGS: 1-SEVERAL DAYS
6. BECOMING EASILY ANNOYED OR IRRITABLE: 1-SEVERAL DAYS

## 2022-03-18 ENCOUNTER — OFFICE VISIT (OUTPATIENT)
Dept: PSYCHOLOGY | Age: 37
End: 2022-03-18
Payer: MEDICAID

## 2022-03-18 ENCOUNTER — NURSE ONLY (OUTPATIENT)
Dept: INTERNAL MEDICINE CLINIC | Age: 37
End: 2022-03-18

## 2022-03-18 DIAGNOSIS — F90.2 ADHD (ATTENTION DEFICIT HYPERACTIVITY DISORDER), COMBINED TYPE: ICD-10-CM

## 2022-03-18 DIAGNOSIS — F90.2 ATTENTION DEFICIT HYPERACTIVITY DISORDER, COMBINED TYPE, MODERATE: ICD-10-CM

## 2022-03-18 DIAGNOSIS — F34.1 PERSISTENT DEPRESSIVE DISORDER WITH ANXIOUS DISTRESS, CURRENTLY MODERATE: Primary | ICD-10-CM

## 2022-03-18 LAB
AMPHETAMINE SCREEN, URINE: POSITIVE
BARBITURATE SCREEN URINE: ABNORMAL
BENZODIAZEPINE SCREEN, URINE: ABNORMAL
CANNABINOID SCREEN URINE: ABNORMAL
COCAINE METABOLITE SCREEN URINE: ABNORMAL
Lab: ABNORMAL
METHADONE SCREEN, URINE: ABNORMAL
OPIATE SCREEN URINE: ABNORMAL
OXYCODONE URINE: ABNORMAL
PH UA: 6
PHENCYCLIDINE SCREEN URINE: ABNORMAL
PROPOXYPHENE SCREEN: ABNORMAL

## 2022-03-18 PROCEDURE — 90832 PSYTX W PT 30 MINUTES: CPT | Performed by: PSYCHOLOGIST

## 2022-03-18 PROCEDURE — 1036F TOBACCO NON-USER: CPT | Performed by: PSYCHOLOGIST

## 2022-03-18 PROCEDURE — 99999 PR OFFICE/OUTPT VISIT,PROCEDURE ONLY: CPT | Performed by: NURSE PRACTITIONER

## 2022-03-18 ASSESSMENT — ANXIETY QUESTIONNAIRES
7. FEELING AFRAID AS IF SOMETHING AWFUL MIGHT HAPPEN: 2-OVER HALF THE DAYS
4. TROUBLE RELAXING: 1-SEVERAL DAYS
5. BEING SO RESTLESS THAT IT IS HARD TO SIT STILL: 1-SEVERAL DAYS
3. WORRYING TOO MUCH ABOUT DIFFERENT THINGS: 2-OVER HALF THE DAYS
6. BECOMING EASILY ANNOYED OR IRRITABLE: 1-SEVERAL DAYS
GAD7 TOTAL SCORE: 10
1. FEELING NERVOUS, ANXIOUS, OR ON EDGE: 1-SEVERAL DAYS
2. NOT BEING ABLE TO STOP OR CONTROL WORRYING: 2-OVER HALF THE DAYS

## 2022-03-18 ASSESSMENT — PATIENT HEALTH QUESTIONNAIRE - PHQ9
SUM OF ALL RESPONSES TO PHQ QUESTIONS 1-9: 1
1. LITTLE INTEREST OR PLEASURE IN DOING THINGS: 0
SUM OF ALL RESPONSES TO PHQ9 QUESTIONS 1 & 2: 1
2. FEELING DOWN, DEPRESSED OR HOPELESS: 1
SUM OF ALL RESPONSES TO PHQ QUESTIONS 1-9: 1

## 2022-03-18 NOTE — PROGRESS NOTES
Behavioral Health Consultation  Jenna Jacobson, Ph.D.  Psychologist  3/18/2022  9:30 AM EDT      Time spent with Patient: 30 minutes  This is patient's tenth [facility] appointment. Reason for Consult: anxious, ADHD  Referring Provider: No primary care provider on file. No primary provider on file. Feedback for PCP:     Pt provided informed consent for the behavioral health program. Discussed with patient model of service to include the limits of confidentiality (i.e. abuse reporting, suicide intervention, etc.) and short-term intervention focused approach. Pt indicated understanding. Feedback given to PCP. S:  Patient described an interaction with Waleska Ventura and his son that he might have taken all the blame for in the past, but he said that he was able to recognize how others contributed to the disagreement, including being able to see how Waleska Ventura contributed, and then be able to ask her for a different behavior in the future that he feels would be more adaptive. He is learning how not to absorb all the blame for \"overreacting,\" and how in some cases, he is reacting to a reasonable provocation.          Social History     Tobacco Use    Smoking status: Former Smoker     Packs/day: 0.25     Years: 20.00     Pack years: 5.00     Types: Cigarettes     Quit date: 2021     Years since quittin.7    Smokeless tobacco: Never Used   Substance Use Topics    Alcohol use: Yes     Comment: 2 times a week and 2 beers        Illicit drugs:   Social History     Substance and Sexual Activity   Drug Use Yes    Types: Marijuana Sravan Newberry)        O:  MSE:  Appearance: good hygiene   Attitude: cooperative and friendly  Consciousness: alert  Orientation: oriented to person, place, time, general circumstance  Memory: recent and remote memory intact  Attention/Concentration: intact during session  Psychomotor Activity: normal  Eye Contact: normal  Speech: normal rate and volume, well-articulated  Mood: less anxious  Affect: congruent  Perception: within normal limits  Thought Content: within normal limits  Thought Process: logical, coherent and goal-directed  Insight: good  Judgment: intact  Ability to understand instructions: Yes  Ability to respond meaningfully: Yes  Morbid Ideation: no   Suicide Assessment: no suicidal ideation, plan, or intent  Homicidal Ideation: no    A:  The patient seems to be more willing to take a risk with communication and be more assertive that will actually help the dynamic with [place]. For instance, a criticism leveled against him is his anger, but when interactions are unpacked a little, there was a needless provocation that could help make communications go much more smoothly. WILDA 7 SCORE 3/18/2022 3/10/2022 2/2/2022 1/27/2022 1/19/2022 1/5/2022 12/1/2021   WILDA-7 Total Score 10 7 12 9 9 12 15     Interpretation of WILDA-7 score: 5-9 = mild anxiety, 10-14 = moderate anxiety, 15+ = severe anxiety. Recommend referral to behavioral health for scores 10 or greater. PHQ Scores 3/18/2022 3/10/2022 2/2/2022 1/27/2022 1/19/2022 1/5/2022 12/1/2021   PHQ2 Score 1 2 2 4 2 3 -   PHQ9 Score 1 7 2 13 2 10 1     Interpretation of Total Score Depression Severity: 1-4 = Minimal depression, 5-9 = Mild depression, 10-14 = Moderate depression, 15-19 = Moderately severe depression, 20-27 = Severe depression    Diagnosis:    1. Persistent depressive disorder with anxious distress, currently moderate    2. Attention deficit hyperactivity disorder, combined type, moderate        Patient Active Problem List   Diagnosis    Reactive airway disease without complication    Greater trochanteric bursitis of left hip    Multiple lipomas    Snoring    Apnea spell    Adjustment disorder         Plan:  Pt interventions:  Supportive techniques, Provided Psychoeducation re: healthy communication patterns and Provided pt book recommendation.        Pt Behavioral Change Plan:  Pt set the following goals:  Pt scheduled F/U visit in two weeks  See section 'A'

## 2022-04-15 DIAGNOSIS — F90.2 ADHD (ATTENTION DEFICIT HYPERACTIVITY DISORDER), COMBINED TYPE: ICD-10-CM

## 2022-04-15 RX ORDER — DEXTROAMPHETAMINE SACCHARATE, AMPHETAMINE ASPARTATE, DEXTROAMPHETAMINE SULFATE AND AMPHETAMINE SULFATE 1.25; 1.25; 1.25; 1.25 MG/1; MG/1; MG/1; MG/1
5 TABLET ORAL DAILY
Qty: 30 TABLET | Refills: 0 | Status: SHIPPED | OUTPATIENT
Start: 2022-04-15 | End: 2022-06-01 | Stop reason: SDUPTHER

## 2022-04-15 RX ORDER — DEXTROAMPHETAMINE SACCHARATE, AMPHETAMINE ASPARTATE MONOHYDRATE, DEXTROAMPHETAMINE SULFATE AND AMPHETAMINE SULFATE 7.5; 7.5; 7.5; 7.5 MG/1; MG/1; MG/1; MG/1
30 CAPSULE, EXTENDED RELEASE ORAL DAILY
Qty: 30 CAPSULE | Refills: 0 | Status: SHIPPED | OUTPATIENT
Start: 2022-04-15 | End: 2022-06-01 | Stop reason: SDUPTHER

## 2022-04-15 NOTE — TELEPHONE ENCOUNTER
LAST OFFICE VISIT :03/10/2022    Future Appointments   Date Time Provider Parris Vargas   5/5/2022  2:30 PM BOBO Galvin - NP [facility]

## 2022-06-01 ENCOUNTER — TELEPHONE (OUTPATIENT)
Dept: INTERNAL MEDICINE CLINIC | Age: 37
End: 2022-06-01

## 2022-06-01 DIAGNOSIS — F90.2 ADHD (ATTENTION DEFICIT HYPERACTIVITY DISORDER), COMBINED TYPE: ICD-10-CM

## 2022-06-01 RX ORDER — DEXTROAMPHETAMINE SACCHARATE, AMPHETAMINE ASPARTATE, DEXTROAMPHETAMINE SULFATE AND AMPHETAMINE SULFATE 1.25; 1.25; 1.25; 1.25 MG/1; MG/1; MG/1; MG/1
5 TABLET ORAL DAILY
Qty: 30 TABLET | Refills: 0 | Status: SHIPPED | OUTPATIENT
Start: 2022-06-01 | End: 2022-07-08 | Stop reason: SDUPTHER

## 2022-06-01 RX ORDER — DEXTROAMPHETAMINE SACCHARATE, AMPHETAMINE ASPARTATE MONOHYDRATE, DEXTROAMPHETAMINE SULFATE AND AMPHETAMINE SULFATE 7.5; 7.5; 7.5; 7.5 MG/1; MG/1; MG/1; MG/1
30 CAPSULE, EXTENDED RELEASE ORAL DAILY
Qty: 30 CAPSULE | Refills: 0 | Status: SHIPPED | OUTPATIENT
Start: 2022-06-01 | End: 2022-07-08 | Stop reason: SDUPTHER

## 2022-06-01 NOTE — PROGRESS NOTES
Patient requesting refill of Adderall 30 mg xr and Adderall 5 mg IR    . OARRS reviewed, [facility] 4/15/22 .  Refill sent, [facility] 6/21

## 2022-06-01 NOTE — TELEPHONE ENCOUNTER
Pt called for medication refill amphetamine-dextroamphetamine (ADDERALL XR) 30 MG extended release capsule Reason For Visit  QUINCY ROSS is here today for a nurse visit for immunizations FLU SHOT.      Current Meds   1. ALPRAZolam 0.25 MG Oral Tablet; TAKE 1 TABLET BY MOUTH DAILY AS NEEDED;   Therapy: 26Jun2018 to (Evaluate:[id number]); Last Rx:26Jun2018 Ordered   2. BuPROPion HCl ER (SR) 100 MG Oral Tablet Extended Release 12 Hour; TAKE 1   TABLET DAILY;   Therapy: 29Jun2018 to (Evaluate:25Jan2019)  Requested for: 29Jun2018; Last   Rx:29Jun2018 Ordered   3. Fluticasone Propionate 50 MCG/ACT Nasal Suspension; USE 2 SPRAYS IN EACH   NOSTRIL ONCE DAILY. GARGLE AND RINSE THROAT AFTER USE;   Therapy: 05Jun2017 to (Evaluate:18Jan2019)  Requested for: 20Sep2018; Last   Rx:[id number] Ordered   4. Multi-Vitamin Oral Tablet;   Therapy: (Recorded:03Jun2015) to Recorded   5. Olopatadine HCl - 0.6 % Nasal Solution; USE 2 SPRAYS IN EACH NOSTRIL TWICE   DAILY;   Therapy: 16Jun2017 to (Last Rx:16Jun2017)  Requested for: 16Jun2017 Ordered   6. TraZODone HCl - 100 MG Oral Tablet; TAKE 1 TABLET BY MOUTH AT BEDTIME    Requested for: 29Jun2018; Last Rx:29Jun2018 Ordered    Screening  Vaccine Screening (Adult):     Reviewed potential contraindications.   See scanned screening form in the patients chart.   Screening questions and answeres reviewed by the Provider.      Nurse Documentation    Patient was observed after administration and no side effects noted., Patient discharged to home., Flu Shot screening form completed., Vaccine authorization form completed.   CDC VIS was given to parent/legal guardian/designated adult. Risks and benefits of vaccines was discussed and questions answered. Parent/legal guardian/designated adult verbalized understanding and signed consent for vaccine.          Assessment   1. Need for immunization against influenza (Z23)    Plan    Return to Clinic as needed.      Signatures   Electronically signed by : BRISA Carolina; Oct 12 2018  9:59AM CST    Electronically signed by : PREM CENTENO; Oct 12  2018 10:05AM CST

## 2022-06-21 ENCOUNTER — OFFICE VISIT (OUTPATIENT)
Dept: INTERNAL MEDICINE CLINIC | Age: 37
End: 2022-06-21
Payer: MEDICAID

## 2022-06-21 VITALS
OXYGEN SATURATION: 97 % | TEMPERATURE: 98.4 F | SYSTOLIC BLOOD PRESSURE: 112 MMHG | DIASTOLIC BLOOD PRESSURE: 74 MMHG | HEART RATE: 85 BPM | WEIGHT: 263.25 LBS | BODY MASS INDEX: 31.22 KG/M2

## 2022-06-21 DIAGNOSIS — Z76.89 ENCOUNTER TO ESTABLISH CARE: ICD-10-CM

## 2022-06-21 DIAGNOSIS — R20.2 PARESTHESIA OF HAND, BILATERAL: Primary | ICD-10-CM

## 2022-06-21 DIAGNOSIS — M67.432 GANGLION CYST OF WRIST, LEFT: ICD-10-CM

## 2022-06-21 DIAGNOSIS — M79.652 LEFT THIGH PAIN: ICD-10-CM

## 2022-06-21 PROCEDURE — G8427 DOCREV CUR MEDS BY ELIG CLIN: HCPCS | Performed by: NURSE PRACTITIONER

## 2022-06-21 PROCEDURE — G8417 CALC BMI ABV UP PARAM F/U: HCPCS | Performed by: NURSE PRACTITIONER

## 2022-06-21 PROCEDURE — 1036F TOBACCO NON-USER: CPT | Performed by: NURSE PRACTITIONER

## 2022-06-21 PROCEDURE — 99214 OFFICE O/P EST MOD 30 MIN: CPT | Performed by: NURSE PRACTITIONER

## 2022-06-21 ASSESSMENT — ENCOUNTER SYMPTOMS
COUGH: 0
SHORTNESS OF BREATH: 0

## 2022-06-21 NOTE — PROGRESS NOTES
Date: 6/21/2022                                               Subjective/Objective:     Chief Complaint   Patient presents with   [address]    ADHD       HPI     Ervin Coon is a 41 yo male, visit today to establish care. Former patient of Dr Pearl Marcum, last seen 1/12/2022. Complains of bilateral hand numbness that started \" a long time ago. \"  He describes a tingling sensation and numbness in his first 3 digits that turns into pain. This is intermittent. Left is worse than right hand. Feels like he gets a cramp in his hand. It is worse when he is playing guitar. He does play guitar for living. He also has a small bump on his left wrist that changes in size and can be tender. No associated redness. He complains of left thigh numbness that turns into a pain when he massages it. He reports the numbness is constant and has been constant for several years. It is worse when he is on his feet for a long time. He does have intermittent low back pain that is sometimes associated with the symptoms. He describes this pain is mild to moderate, more annoying than anything. It has not worsened since it began several years ago. Follows with psychiatry (INES Michel) for ADHD, managed on Adderall.               Patient Active Problem List    Diagnosis Date Noted    Adjustment disorder 01/12/2021    Reactive airway disease without complication 38/77/1472    Greater trochanteric bursitis of left hip 04/03/2019    Multiple lipomas 04/03/2019    Snoring 04/03/2019    Apnea spell 04/03/2019       Past Medical History:   Diagnosis Date    ADHD (attention deficit hyperactivity disorder)     ADD     Anxiety     Depression        Past Surgical History:   Procedure Laterality Date    KNEE SURGERY      right x 2       Nurse Only on 03/18/2022   Component Date Value Ref Range Status    Amphetamine Screen, Urine 03/18/2022 POSITIVE* Negative <1000ng/mL Final    Comment: High concentrations of ephedrine/pseudoephedrine or  phenylpropanolamine may cause false positive results  for amphetamine. Therefore, confirmatory testing for  amphetamine should be considered if clinically indicated.  Barbiturate Screen, Ur 03/18/2022 Neg  Negative <200 ng/mL Final    Benzodiazepine Screen, Urine 03/18/2022 Neg  Negative <200 ng/mL Final    Cannabinoid Scrn, Ur 03/18/2022 Neg  Negative <50 ng/mL Final    Cocaine Metabolite Screen, Urine 03/18/2022 Neg  Negative <300 ng/mL Final    Opiate Scrn, Ur 03/18/2022 Neg  Negative <300 ng/mL Final    Comment: \"Therapeutic levels of pain medication, especially oxycontin and synthetic  opioids, may not be detected by this Methodology. Pain management screen  panel  Drug panel-PM-Hi Res Ur, Interp (PAIN) should be considered for drug  monitoring \".  PCP Screen, Urine 03/18/2022 Neg  Negative <25 ng/mL Final    Methadone Screen, Urine 03/18/2022 Neg  Negative <300 ng/mL Final    Propoxyphene Scrn, Ur 03/18/2022 Neg  Negative <300 ng/mL Final    Oxycodone Urine 03/18/2022 Neg  Negative <100 ng/ml Final    pH, UA 03/18/2022 6.0   Final    Comment: Urine pH less than 5.0 or greater than 8.0 may indicate sample adulteration. Another sample should be collected if clinically  indicated.  Drug Screen Comment: 03/18/2022 see below   Final    Comment: This method is a screening test to detect only these drug  classes as part of a medical workup. Confirmatory testing  by another method should be ordered if clinically indicated.          Family History   Problem Relation Age of Onset    Arthritis Mother     Anemia Father     Other Father         DVT    Arthritis Maternal Grandmother     Alcohol Abuse Maternal Grandfather     Stroke Maternal Grandfather     Alzheimer's Disease Paternal Grandmother        Current Outpatient Medications   Medication Sig Dispense Refill    amphetamine-dextroamphetamine (ADDERALL XR) 30 MG extended release capsule Take 1 capsule by mouth daily for 30 days. 30 capsule 0    amphetamine-dextroamphetamine (ADDERALL, 5MG,) 5 MG tablet Take 1 tablet by mouth daily for 30 days. 30 tablet 0     No current facility-administered medications for this visit. No Known Allergies    Review of Systems   Constitutional: Negative for chills and fever. Respiratory: Negative for cough and shortness of breath. Cardiovascular: Negative for chest pain. Musculoskeletal: Positive for arthralgias. Left thigh numbness/pain   Neurological: Positive for numbness. Bilateral hand numbness       Vitals:  /74 (Site: Left Upper Arm, Position: Sitting, Cuff Size: Large Adult)   Pulse 85   Temp 98.4 °F (36.9 °C) (Oral)   Wt 263 lb 4 oz (119.4 kg)   SpO2 97%   BMI 31.22 kg/m²     Physical Exam  Vitals reviewed. Constitutional:       General: He is not in acute distress. HENT:      Head: Normocephalic and atraumatic. Cardiovascular:      Rate and Rhythm: Normal rate and regular rhythm. Heart sounds: Normal heart sounds. Pulmonary:      Effort: Pulmonary effort is normal. No respiratory distress. Breath sounds: Normal breath sounds. No wheezing. Abdominal:      General: Bowel sounds are normal. There is no distension. Palpations: Abdomen is soft. Tenderness: There is no abdominal tenderness. Musculoskeletal:      Right wrist: No swelling or tenderness. Normal range of motion. Normal pulse. Left wrist: No swelling or tenderness. Normal range of motion. Normal pulse. Arms:       Comments: Small cyst left dorsal wrist   Skin:     General: Skin is warm and dry. Neurological:      General: No focal deficit present. Mental Status: He is alert and oriented to person, place, and time. Psychiatric:         Mood and Affect: Mood normal.         Behavior: Behavior normal.         Thought Content: Thought content normal.         Judgment: Judgment normal.         Assessment/Plan     1.  Encounter to establish care    2. Paresthesia of hand, bilateral: suspect CTS  - EMG; Future  - Zeke Foss MD, Orthopedic Surgery, [facility]   - Obtain EMG and refer to hand surgery    3. Ganglion cyst of wrist, left: left wrist cyst fluctuating in size, non tender, no erythema  - [place] - Komal Fregoso MD, Orthopedic Surgery, [facility]   - Refer to hand surgery    4. Left thigh pain: chronic numbness and pain with some associated low back pain  - XR LUMBAR SPINE (2-3 VIEWS); Future    - Obtain xray, if negative will proceed with PT      Orders Placed This Encounter   Procedures    XR LUMBAR SPINE (2-3 VIEWS)     Standing Status:   Future     Standing Expiration Date:   6/21/2023     Order Specific Question:   Reason for exam:     Answer:   left thigh pain with low back pain   Zeke Foss MD, Orthopedic Surgery, [facility]     Referral Priority:   Routine     Referral Type:   Eval and Treat     Referral Reason:   Specialty Services Required     Referred to Provider:   Britney Ledbetter MD     Requested Specialty:   Orthopedic Surgery     Number of Visits Requested:   1    EMG     Standing Status:   Future     Standing Expiration Date:   8/20/2022     Order Specific Question:   Which body part? Answer:   Mario Alberto Gonzalez       Return if symptoms worsen or fail to improve. OR sooner with questions, concerns, worsening symptoms    BOBO RENDON  6/21/2022  11:17 AM    Discussed use, benefit, and side effects of prescribed medications. Barriers to medication compliance addressed. Discussed all ordered testing and labs. All patient questions answered. Patient agreeable with plan above. Please note that this chart was generated using dragon dictation software. Although every effort was made to ensure the accuracy of this automated transcription, some errors in transcription may have occurred.

## 2022-07-08 ENCOUNTER — TELEPHONE (OUTPATIENT)
Dept: INTERNAL MEDICINE CLINIC | Age: 37
End: 2022-07-08

## 2022-07-08 DIAGNOSIS — F90.2 ADHD (ATTENTION DEFICIT HYPERACTIVITY DISORDER), COMBINED TYPE: ICD-10-CM

## 2022-07-08 RX ORDER — DEXTROAMPHETAMINE SACCHARATE, AMPHETAMINE ASPARTATE MONOHYDRATE, DEXTROAMPHETAMINE SULFATE AND AMPHETAMINE SULFATE 7.5; 7.5; 7.5; 7.5 MG/1; MG/1; MG/1; MG/1
30 CAPSULE, EXTENDED RELEASE ORAL DAILY
Qty: 30 CAPSULE | Status: CANCELLED | OUTPATIENT
Start: 2022-07-08 | End: 2022-08-07

## 2022-07-08 RX ORDER — DEXTROAMPHETAMINE SACCHARATE, AMPHETAMINE ASPARTATE, DEXTROAMPHETAMINE SULFATE AND AMPHETAMINE SULFATE 1.25; 1.25; 1.25; 1.25 MG/1; MG/1; MG/1; MG/1
5 TABLET ORAL DAILY
Qty: 30 TABLET | Refills: 0 | Status: SHIPPED | OUTPATIENT
Start: 2022-07-08 | End: 2022-08-07

## 2022-07-08 RX ORDER — DEXTROAMPHETAMINE SACCHARATE, AMPHETAMINE ASPARTATE MONOHYDRATE, DEXTROAMPHETAMINE SULFATE AND AMPHETAMINE SULFATE 7.5; 7.5; 7.5; 7.5 MG/1; MG/1; MG/1; MG/1
30 CAPSULE, EXTENDED RELEASE ORAL DAILY
Qty: 30 CAPSULE | Refills: 0 | Status: SHIPPED | OUTPATIENT
Start: 2022-07-08 | End: 2022-08-07

## 2024-03-05 NOTE — PROGRESS NOTES
Addended by: ADRIANA DELGADO on: 3/5/2024 03:17 PM     Modules accepted: Orders     Behavioral Health Consultation  Milo Palomares, Ph.D.  Psychologist  12/15/2021  10:00 AM EDT      Time spent with Patient: 30 minutes  This is patient's fifth [facility] appointment. Reason for Consult: anxious, worried, some anger  Referring Provider: Srinivasan Luna, [address] [facility] [address]    Feedback for PCP:     Pt provided informed consent for the behavioral health program. Discussed with patient model of service to include the limits of confidentiality (i.e. abuse reporting, suicide intervention, etc.) and short-term intervention focused approach. Pt indicated understanding. Feedback given to PCP. S:  Patient reports that the communication with his wife Angel Fitzgerald seems to be improving. He said that the issue we discussed last visit about how common the \"I'm in trouble\" feeling is with people with ADHD, because it was so often true. He said that they are 'nesting' for their baby that is due on March 28, 2022. He wondered about how he and his wife could improve their communication even more and asked if she could come in on a visit. Social History     Tobacco Use    Smoking status: Former Smoker     Packs/day: 0.25     Types: Cigarettes    Smokeless tobacco: Never Used    Tobacco comment: may be 1/4 pack a day and some days not smoke at all.    Substance Use Topics    Alcohol use: Yes     Comment: 2 times a week and 2 beers        Illicit drugs:   Social History     Substance and Sexual Activity   Drug Use Yes    Types: Marijuana Kristen Merissa)        O:  MSE:  Appearance: good hygiene   Attitude: cooperative and friendly  Consciousness: alert  Orientation: oriented to person, place, time, general circumstance  Memory: recent and remote memory intact  Attention/Concentration: intact during session  Psychomotor Activity: normal  Eye Contact: normal  Speech: normal rate and volume, well-articulated  Mood: less anxious  Affect: congruent  Perception: within normal limits  Thought Content: within normal limits  Thought Process: logical, coherent and goal-directed  Insight: good  Judgment: intact  Ability to understand instructions: Yes  Ability to respond meaningfully: Yes  Morbid Ideation: no   Suicide Assessment: no suicidal ideation, plan, or intent  Homicidal Ideation: no    A:  He said that he still plans on picking up [employer] as an ADHD resource. He is still using his tools to reduce the quick to frustration impulse, which he said his father had as well. He said that he's like to see if his wife would come to a visit with him to work on their relationship. WILDA 7 SCORE 12/1/2021 10/27/2021 9/29/2021 9/15/2021   WILDA-7 Total Score 15 13 15 18     Interpretation of WILDA-7 score: 5-9 = mild anxiety, 10-14 = moderate anxiety, 15+ = severe anxiety. Recommend referral to behavioral health for scores 10 or greater. PHQ Scores 12/1/2021 10/27/2021 9/29/2021 9/15/2021 1/12/2021 4/3/2019   PHQ2 Score - 4 2 6 3 0   PHQ9 Score 1 14 2 20 18 0     Interpretation of Total Score Depression Severity: 1-4 = Minimal depression, 5-9 = Mild depression, 10-14 = Moderate depression, 15-19 = Moderately severe depression, 20-27 = Severe depression    Diagnosis:    1. Adjustment disorder with mixed anxiety and depressed mood    2. Attention deficit hyperactivity disorder, combined type, moderate        Patient Active Problem List   Diagnosis    Reactive airway disease without complication    Greater trochanteric bursitis of left hip    Multiple lipomas    Snoring    Apnea spell    Adjustment disorder         Plan:  Pt interventions:  Supportive techniques, Provided Psychoeducation re: healthy communication patterns and Provided pt book recommendation.        Pt Behavioral Change Plan:  Pt set the following goals:  Pt scheduled F/U visit in two weeks  See section 'A'

## 2024-06-06 ENCOUNTER — OFFICE VISIT (OUTPATIENT)
Dept: INTERNAL MEDICINE CLINIC | Age: 39
End: 2024-06-06
Payer: MEDICAID

## 2024-06-06 VITALS
OXYGEN SATURATION: 98 % | DIASTOLIC BLOOD PRESSURE: 84 MMHG | HEIGHT: 77 IN | BODY MASS INDEX: 31.29 KG/M2 | SYSTOLIC BLOOD PRESSURE: 122 MMHG | HEART RATE: 82 BPM | WEIGHT: 265 LBS

## 2024-06-06 DIAGNOSIS — Z00.00 PREVENTATIVE HEALTH CARE: ICD-10-CM

## 2024-06-06 DIAGNOSIS — F90.2 ADHD (ATTENTION DEFICIT HYPERACTIVITY DISORDER), COMBINED TYPE: Primary | ICD-10-CM

## 2024-06-06 DIAGNOSIS — Z30.09 VASECTOMY EVALUATION: ICD-10-CM

## 2024-06-06 DIAGNOSIS — G47.39 SLEEP APNEA-LIKE BEHAVIOR: ICD-10-CM

## 2024-06-06 DIAGNOSIS — M67.432 GANGLION CYST OF WRIST, LEFT: ICD-10-CM

## 2024-06-06 LAB
ALBUMIN SERPL-MCNC: 4.7 G/DL (ref 3.4–5)
ALBUMIN/GLOB SERPL: 1.9 {RATIO} (ref 1.1–2.2)
ALP SERPL-CCNC: 75 U/L (ref 40–129)
ALT SERPL-CCNC: 22 U/L (ref 10–40)
ANION GAP SERPL CALCULATED.3IONS-SCNC: 14 MMOL/L (ref 3–16)
ANNOTATION COMMENT IMP: NORMAL
AST SERPL-CCNC: 22 U/L (ref 15–37)
BASOPHILS # BLD: 0.1 K/UL (ref 0–0.2)
BASOPHILS NFR BLD: 0.6 %
BILIRUB SERPL-MCNC: 0.5 MG/DL (ref 0–1)
BUN SERPL-MCNC: 15 MG/DL (ref 7–20)
CALCIUM SERPL-MCNC: 9.7 MG/DL (ref 8.3–10.6)
CHLORIDE SERPL-SCNC: 99 MMOL/L (ref 99–110)
CO2 SERPL-SCNC: 25 MMOL/L (ref 21–32)
CREAT SERPL-MCNC: 0.9 MG/DL (ref 0.9–1.3)
DEPRECATED RDW RBC AUTO: 13.4 % (ref 12.4–15.4)
EOSINOPHIL # BLD: 0.2 K/UL (ref 0–0.6)
EOSINOPHIL NFR BLD: 1.9 %
GFR SERPLBLD CREATININE-BSD FMLA CKD-EPI: >90 ML/MIN/{1.73_M2}
GLUCOSE SERPL-MCNC: 92 MG/DL (ref 70–99)
HCT VFR BLD AUTO: 42.6 % (ref 40.5–52.5)
HGB BLD-MCNC: 14.9 G/DL (ref 13.5–17.5)
LYMPHOCYTES # BLD: 1.7 K/UL (ref 1–5.1)
LYMPHOCYTES NFR BLD: 17.6 %
MCH RBC QN AUTO: 30.4 PG (ref 26–34)
MCHC RBC AUTO-ENTMCNC: 34.9 G/DL (ref 31–36)
MCV RBC AUTO: 87.1 FL (ref 80–100)
MONOCYTES # BLD: 0.6 K/UL (ref 0–1.3)
MONOCYTES NFR BLD: 6.5 %
NEUTROPHILS # BLD: 7 K/UL (ref 1.7–7.7)
NEUTROPHILS NFR BLD: 73.4 %
PLATELET # BLD AUTO: 206 K/UL (ref 135–450)
PMV BLD AUTO: 8.6 FL (ref 5–10.5)
POTASSIUM SERPL-SCNC: 4.4 MMOL/L (ref 3.5–5.1)
PROT SERPL-MCNC: 7.2 G/DL (ref 6.4–8.2)
RBC # BLD AUTO: 4.89 M/UL (ref 4.2–5.9)
SODIUM SERPL-SCNC: 138 MMOL/L (ref 136–145)
WBC # BLD AUTO: 9.5 K/UL (ref 4–11)

## 2024-06-06 PROCEDURE — 99214 OFFICE O/P EST MOD 30 MIN: CPT | Performed by: NURSE PRACTITIONER

## 2024-06-06 PROCEDURE — G2211 COMPLEX E/M VISIT ADD ON: HCPCS | Performed by: NURSE PRACTITIONER

## 2024-06-06 PROCEDURE — 1036F TOBACCO NON-USER: CPT | Performed by: NURSE PRACTITIONER

## 2024-06-06 PROCEDURE — G8417 CALC BMI ABV UP PARAM F/U: HCPCS | Performed by: NURSE PRACTITIONER

## 2024-06-06 PROCEDURE — G8427 DOCREV CUR MEDS BY ELIG CLIN: HCPCS | Performed by: NURSE PRACTITIONER

## 2024-06-06 SDOH — ECONOMIC STABILITY: HOUSING INSECURITY
IN THE LAST 12 MONTHS, WAS THERE A TIME WHEN YOU DID NOT HAVE A STEADY PLACE TO SLEEP OR SLEPT IN A SHELTER (INCLUDING NOW)?: NO

## 2024-06-06 SDOH — ECONOMIC STABILITY: FOOD INSECURITY: WITHIN THE PAST 12 MONTHS, THE FOOD YOU BOUGHT JUST DIDN'T LAST AND YOU DIDN'T HAVE MONEY TO GET MORE.: NEVER TRUE

## 2024-06-06 SDOH — ECONOMIC STABILITY: FOOD INSECURITY: WITHIN THE PAST 12 MONTHS, YOU WORRIED THAT YOUR FOOD WOULD RUN OUT BEFORE YOU GOT MONEY TO BUY MORE.: NEVER TRUE

## 2024-06-06 SDOH — ECONOMIC STABILITY: INCOME INSECURITY: HOW HARD IS IT FOR YOU TO PAY FOR THE VERY BASICS LIKE FOOD, HOUSING, MEDICAL CARE, AND HEATING?: NOT HARD AT ALL

## 2024-06-06 ASSESSMENT — PATIENT HEALTH QUESTIONNAIRE - PHQ9
SUM OF ALL RESPONSES TO PHQ QUESTIONS 1-9: 2
2. FEELING DOWN, DEPRESSED OR HOPELESS: SEVERAL DAYS
SUM OF ALL RESPONSES TO PHQ9 QUESTIONS 1 & 2: 2
SUM OF ALL RESPONSES TO PHQ QUESTIONS 1-9: 2
1. LITTLE INTEREST OR PLEASURE IN DOING THINGS: SEVERAL DAYS

## 2024-06-06 ASSESSMENT — ENCOUNTER SYMPTOMS: SHORTNESS OF BREATH: 0

## 2024-06-06 NOTE — PROGRESS NOTES
Date: 6/6/2024                                               Subjective/Objective:     Chief Complaint   Patient presents with    ADHD     Follow up / discuss meds        HPI    Augusto Gillette is a 38 yo male, visit today for ADHD.     ADHD - previously followed by psychiatry. On Adderall XR 30 mg daily and IR 5 mg daily. He had trouble scheduling with psychiatry and so has been off medication for some time. He reports he tolerated the medication well, no side effects. Has been suffering with ADHD symptoms and would like to resume.     At previous OV 6/2022 was referred to orthopedic for cyst of wrist. Did not see. Is wanting a new referral. This is tender when he bumps it.     Previously referred for sleep study for snoring. Does have daytime fatigue at times. Did not see sleep medicine.     Would like referral for vasectomy.          Patient Active Problem List    Diagnosis Date Noted    ADHD (attention deficit hyperactivity disorder), combined type 06/06/2024    Adjustment disorder 01/12/2021    Reactive airway disease without complication 04/03/2019    Greater trochanteric bursitis of left hip 04/03/2019    Multiple lipomas 04/03/2019    Snoring 04/03/2019    Apnea spell 04/03/2019       Past Medical History:   Diagnosis Date    ADHD (attention deficit hyperactivity disorder)     ADD     Anxiety     Depression        Past Surgical History:   Procedure Laterality Date    KNEE SURGERY      right x 2       Nurse Only on 03/18/2022   Component Date Value Ref Range Status    Amphetamine Screen, Ur 03/18/2022 POSITIVE (A)  Negative <1000ng/mL Final    Comment: High concentrations of ephedrine/pseudoephedrine or  phenylpropanolamine may cause false positive results  for amphetamine. Therefore, confirmatory testing for  amphetamine should be considered if clinically indicated.      Barbiturate Screen, Ur 03/18/2022 Neg  Negative <200 ng/mL Final    Benzodiazepine Screen, Urine 03/18/2022 Neg  Negative <200 ng/mL Final

## 2024-06-10 DIAGNOSIS — F90.2 ADHD (ATTENTION DEFICIT HYPERACTIVITY DISORDER), COMBINED TYPE: Primary | ICD-10-CM

## 2024-06-10 LAB

## 2024-06-10 RX ORDER — DEXTROAMPHETAMINE SACCHARATE, AMPHETAMINE ASPARTATE MONOHYDRATE, DEXTROAMPHETAMINE SULFATE AND AMPHETAMINE SULFATE 5; 5; 5; 5 MG/1; MG/1; MG/1; MG/1
20 CAPSULE, EXTENDED RELEASE ORAL EVERY MORNING
Qty: 30 CAPSULE | Refills: 0 | Status: SHIPPED | OUTPATIENT
Start: 2024-06-10 | End: 2024-07-10

## 2024-06-24 ENCOUNTER — OFFICE VISIT (OUTPATIENT)
Dept: ORTHOPEDIC SURGERY | Age: 39
End: 2024-06-24
Payer: COMMERCIAL

## 2024-06-24 VITALS — RESPIRATION RATE: 16 BRPM | WEIGHT: 265 LBS | BODY MASS INDEX: 31.29 KG/M2 | HEIGHT: 77 IN

## 2024-06-24 DIAGNOSIS — R20.0 HAND NUMBNESS: Primary | ICD-10-CM

## 2024-06-24 PROCEDURE — G8427 DOCREV CUR MEDS BY ELIG CLIN: HCPCS | Performed by: PHYSICIAN ASSISTANT

## 2024-06-24 PROCEDURE — G8417 CALC BMI ABV UP PARAM F/U: HCPCS | Performed by: PHYSICIAN ASSISTANT

## 2024-06-24 PROCEDURE — 99203 OFFICE O/P NEW LOW 30 MIN: CPT | Performed by: PHYSICIAN ASSISTANT

## 2024-06-24 PROCEDURE — L3908 WHO COCK-UP NONMOLDE PRE OTS: HCPCS | Performed by: PHYSICIAN ASSISTANT

## 2024-06-24 PROCEDURE — 1036F TOBACCO NON-USER: CPT | Performed by: PHYSICIAN ASSISTANT

## 2024-06-24 NOTE — PROGRESS NOTES
surgical treatment.       Mr. Augusto Gillette has been given a full verbal list of instructions and precautions related to his present condition.  I have asked him to followup with me in the office at the prescribed time. He is also specifically requested to call or return to the office sooner if his symptoms change or worsen prior to the next scheduled appointment.

## 2024-07-16 DIAGNOSIS — F90.2 ADHD (ATTENTION DEFICIT HYPERACTIVITY DISORDER), COMBINED TYPE: ICD-10-CM

## 2024-07-16 NOTE — TELEPHONE ENCOUNTER
Future Appointments   Date Time Provider Department Center   8/5/2024  8:40 AM Wild Cabello MD [facility]   9/12/2024  1:45 PM Genna Matos APRN [facility]       Last appt 6/6/24

## 2024-07-17 RX ORDER — DEXTROAMPHETAMINE SACCHARATE, AMPHETAMINE ASPARTATE MONOHYDRATE, DEXTROAMPHETAMINE SULFATE AND AMPHETAMINE SULFATE 5; 5; 5; 5 MG/1; MG/1; MG/1; MG/1
20 CAPSULE, EXTENDED RELEASE ORAL EVERY MORNING
Qty: 30 CAPSULE | Refills: 0 | Status: SHIPPED | OUTPATIENT
Start: 2024-07-17 | End: 2024-08-16

## 2024-08-05 ENCOUNTER — OFFICE VISIT (OUTPATIENT)
Dept: SLEEP MEDICINE | Age: 39
End: 2024-08-05
Payer: COMMERCIAL

## 2024-08-05 VITALS
BODY MASS INDEX: 30.72 KG/M2 | TEMPERATURE: 97.2 F | OXYGEN SATURATION: 100 % | DIASTOLIC BLOOD PRESSURE: 60 MMHG | HEART RATE: 58 BPM | RESPIRATION RATE: 18 BRPM | WEIGHT: 260.2 LBS | SYSTOLIC BLOOD PRESSURE: 115 MMHG | HEIGHT: 77 IN

## 2024-08-05 DIAGNOSIS — R06.81 WITNESSED EPISODE OF APNEA: ICD-10-CM

## 2024-08-05 DIAGNOSIS — G47.19 EXCESSIVE DAYTIME SLEEPINESS: ICD-10-CM

## 2024-08-05 DIAGNOSIS — J34.3 NASAL TURBINATE HYPERTROPHY: ICD-10-CM

## 2024-08-05 DIAGNOSIS — J34.2 NASAL SEPTAL DEVIATION: ICD-10-CM

## 2024-08-05 DIAGNOSIS — G47.33 OSA (OBSTRUCTIVE SLEEP APNEA): Primary | ICD-10-CM

## 2024-08-05 DIAGNOSIS — R06.83 SNORING: ICD-10-CM

## 2024-08-05 DIAGNOSIS — E66.9 OBESITY (BMI 30.0-34.9): ICD-10-CM

## 2024-08-05 DIAGNOSIS — R09.81 NASAL CONGESTION: ICD-10-CM

## 2024-08-05 PROCEDURE — G8427 DOCREV CUR MEDS BY ELIG CLIN: HCPCS | Performed by: PSYCHIATRY & NEUROLOGY

## 2024-08-05 PROCEDURE — 1036F TOBACCO NON-USER: CPT | Performed by: PSYCHIATRY & NEUROLOGY

## 2024-08-05 PROCEDURE — 99204 OFFICE O/P NEW MOD 45 MIN: CPT | Performed by: PSYCHIATRY & NEUROLOGY

## 2024-08-05 PROCEDURE — G8417 CALC BMI ABV UP PARAM F/U: HCPCS | Performed by: PSYCHIATRY & NEUROLOGY

## 2024-08-05 RX ORDER — FLUTICASONE PROPIONATE 50 MCG
2 SPRAY, SUSPENSION (ML) NASAL DAILY
Qty: 16 G | Refills: 5 | Status: SHIPPED | OUTPATIENT
Start: 2024-08-05

## 2024-08-05 ASSESSMENT — SLEEP AND FATIGUE QUESTIONNAIRES
ESS TOTAL SCORE: 5
HOW LIKELY ARE YOU TO NOD OFF OR FALL ASLEEP WHILE SITTING AND READING: MODERATE CHANCE OF DOZING
HOW LIKELY ARE YOU TO NOD OFF OR FALL ASLEEP WHILE SITTING AND TALKING TO SOMEONE: WOULD NEVER DOZE
HOW LIKELY ARE YOU TO NOD OFF OR FALL ASLEEP WHILE LYING DOWN TO REST IN THE AFTERNOON WHEN CIRCUMSTANCES PERMIT: SLIGHT CHANCE OF DOZING
HOW LIKELY ARE YOU TO NOD OFF OR FALL ASLEEP WHILE SITTING QUIETLY AFTER LUNCH WITHOUT ALCOHOL: WOULD NEVER DOZE
HOW LIKELY ARE YOU TO NOD OFF OR FALL ASLEEP WHILE WATCHING TV: SLIGHT CHANCE OF DOZING
HOW LIKELY ARE YOU TO NOD OFF OR FALL ASLEEP WHILE SITTING INACTIVE IN A PUBLIC PLACE: WOULD NEVER DOZE
HOW LIKELY ARE YOU TO NOD OFF OR FALL ASLEEP IN A CAR, WHILE STOPPED FOR A FEW MINUTES IN TRAFFIC: WOULD NEVER DOZE
HOW LIKELY ARE YOU TO NOD OFF OR FALL ASLEEP WHEN YOU ARE A PASSENGER IN A CAR FOR AN HOUR WITHOUT A BREAK: SLIGHT CHANCE OF DOZING

## 2024-08-05 ASSESSMENT — ENCOUNTER SYMPTOMS
WHEEZING: 1
APNEA: 1
EYES NEGATIVE: 1
GASTROINTESTINAL NEGATIVE: 1

## 2024-08-05 NOTE — PATIENT INSTRUCTIONS
Orders Placed This Encounter   Procedures    Ambulatory referral to ENT     Referral Priority:   Routine     Referral Type:   Eval and Treat     Referral Reason:   Specialty Services Required     Number of Visits Requested:   1    Home Sleep Study     Standing Status:   Future     Standing Expiration Date:   8/5/2025     Order Specific Question:   Location For Sleep Study     Answer:   [place]     Order Specific Question:   Select Sleep Lab Location     Answer:   [facility]

## 2024-08-05 NOTE — PROGRESS NOTES
MD SELAM Lau Board Certified in Sleep Medicine  [facility]  Board Certified in Neurology [facility]  [address]   [address]  [place]  [address]  P- [phone]   [facility]   [address]  [address]   [place], [place] [address]           [facility] [place] [facility]  [address] [address] [phone]    Subjective:     Patient ID: Augusto Gillette is a 39 y.o. male.    Chief Complaint   Patient presents with    [facility] Care    Snoring       HPI:        Augusto Gillette is a 39 y.o. male referred by Shawna BROUSSARD for a sleep evaluation. He complains of snoring, snorting, periods of not breathing, tossing and turning, excessive daytime sleepiness, feels sleepy during the day but he denies knees buckling with laughing, completely or partially paralyzed while falling asleep or waking up.  Symptoms began several years ago, gradually worsening since that time.   The patient's bed-partner confirmed the snoring and stopped breathing at night  SLEEP SCHEDULE: Goes to bed around 10:30 PM in the weekdays and 1-3 AM in the weekends. It usually takes the patient few minutes to fall asleep. The patient gets up 1-2 per night to go to the bathroom. The Patient finally gets up at 7 AM during the weekdays and 7:30-8:30 AM in the weekends. patient wakes up with dry mouth and sometimes morning headache.. the headache usually dull headache.  The patient has restless sleep with frequent arousals in addition to the Patient has significant daytime sleepiness. The Patient scored [place] Sleepiness Score: 5 on [place] Sleepiness Scale ( more than 10 is indicative of daytime sleepiness)and 42 in fatigue scale ( more than 36 is indicative of daytime fatigue). The patient takes no naps.    Previous evaluation and treatment has included- none.    The Patient has been obese for

## 2024-08-19 ENCOUNTER — HOSPITAL ENCOUNTER (OUTPATIENT)
Dept: SLEEP CENTER | Age: 39
Discharge: HOME | End: 2024-08-19
Attending: PSYCHIATRY & NEUROLOGY
Payer: COMMERCIAL

## 2024-08-19 ENCOUNTER — OFFICE VISIT (OUTPATIENT)
Dept: ENT CLINIC | Age: 39
End: 2024-08-19
Payer: COMMERCIAL

## 2024-08-19 VITALS
WEIGHT: 259 LBS | OXYGEN SATURATION: 100 % | HEIGHT: 77 IN | SYSTOLIC BLOOD PRESSURE: 114 MMHG | HEART RATE: 64 BPM | DIASTOLIC BLOOD PRESSURE: 78 MMHG | BODY MASS INDEX: 30.58 KG/M2

## 2024-08-19 DIAGNOSIS — G47.30 SLEEP DISORDER BREATHING: ICD-10-CM

## 2024-08-19 DIAGNOSIS — J34.89 NASAL VALVE STENOSIS: ICD-10-CM

## 2024-08-19 DIAGNOSIS — J34.2 DEVIATED SEPTUM: ICD-10-CM

## 2024-08-19 DIAGNOSIS — R09.81 NASAL CONGESTION: Primary | ICD-10-CM

## 2024-08-19 DIAGNOSIS — G47.33 OSA (OBSTRUCTIVE SLEEP APNEA): ICD-10-CM

## 2024-08-19 DIAGNOSIS — J34.3 HYPERTROPHY OF INFERIOR NASAL TURBINATE: ICD-10-CM

## 2024-08-19 PROCEDURE — G8427 DOCREV CUR MEDS BY ELIG CLIN: HCPCS | Performed by: OTOLARYNGOLOGY

## 2024-08-19 PROCEDURE — 1036F TOBACCO NON-USER: CPT | Performed by: OTOLARYNGOLOGY

## 2024-08-19 PROCEDURE — 31231 NASAL ENDOSCOPY DX: CPT | Performed by: OTOLARYNGOLOGY

## 2024-08-19 PROCEDURE — 95806 SLEEP STUDY UNATT&RESP EFFT: CPT

## 2024-08-19 PROCEDURE — G8417 CALC BMI ABV UP PARAM F/U: HCPCS | Performed by: OTOLARYNGOLOGY

## 2024-08-19 PROCEDURE — 99204 OFFICE O/P NEW MOD 45 MIN: CPT | Performed by: OTOLARYNGOLOGY

## 2024-08-19 NOTE — PROGRESS NOTES
[facility]  DIVISION OF OTOLARYNGOLOGY- HEAD & NECK SURGERY  CONSULT      Patient Name: Augusto Gillette  Medical Record Number:  [id number]  Primary Care Physician:  Genna Matos APRN  Date of Consultation: 8/19/2024    Chief Complaint: Nasal issues        HISTORY OF PRESENT ILLNESS  Augusto is a(n) 39 y.o. male who presents for evaluation nasal issues.  The patient says that he had trouble breathing out of his nose for a long time.  He did injure it in eighth grade and thinks that since that time it has been worse.  He has not tried any medications.  He did see sleep medicine recently who prescribed Flonase, but he admits he forgot to take it.  He is getting a sleep study tonight.  He has significant snoring.  His sense of smell is a little bit limited.  He is never had any nasal surgery.  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   Face:  Symmetric, facial nerve intact, no sinus tenderness  Right Ear: Normal external ear, normal external auditory canal, intact tympanic membrane with normal mobility and aerated middle ear  Left Ear: Normal external ear, normal external auditory canal, intact tympanic membrane with normal mobility and aerated middle ear  Mouth/Oral Cavity:  normal lips, Uvula is midline, no mucosal lesions, no trismus  Oropharynx/Larynx:  normal oropharynx  Nose: See below  NECK: Normal range of motion, no thyromegaly, trachea is midline, no lymphadenopathy, no neck masses, no crepitus          PROCEDURE  Nasal exam with nasal endoscopy  The patient does have fairly narrow nasal aperture's bilaterally.  He has some mild valve collapse probably little bit worse on the left.  He does have some improvement with a modified Cuca maneuver.  Afrin and lidocaine were applied the nasal cavity.  A rigid

## 2024-08-21 PROBLEM — G47.33 OSA (OBSTRUCTIVE SLEEP APNEA): Status: ACTIVE | Noted: 2024-08-21

## 2024-08-21 PROCEDURE — 95806 SLEEP STUDY UNATT&RESP EFFT: CPT | Performed by: PSYCHIATRY & NEUROLOGY

## 2024-08-21 NOTE — PROGRESS NOTES
Augusto Gillette         : 1985  [] [facility]     [] [facility]      [] [facility]     [][facility]    [] [facility]  [] [facility]  [] [facility]   [] [facility] [] Other:  Diagnosis: [x] ERIC (G47.33) [] CSA (G47.31) [] Apnea (G47.30)   Length of Need: [] 12 Months [x] 99 Months [] Other:    Machine (CHACHO!):  [x] ResMed AirSense     Auto [] Other:     [x]  CPAP () [] Bilevel ()   Mode: [x] Auto [] Spontaneous    Mode: [] Auto [] Spontaneous           Between 5 and 12 cm                 Comfort Settings:     If the order for CPAP  - Ramp Pressure: 5 cmH2O                                        - Ramp time: 15 min                                     -  Flex/EPR - 3 full time       Humidifier: [x] Heated ()        [x] Water chamber replacement ()/ 1 per 6 months        Mask:  Please always start with the mask the patient used during the titraion   [x] Nasal () /1 per 3 months [x] Full Face () /1 per 3 months   [x] Patient choice -Size and fit mask [x] Patient Choice - Size and fit mask   [] Dispense:  [] Dispense:    [x] Headgear () / 1 per 6 months [x] Headgear () / 1 per 3 months   [x] Replacement Nasal Cushion ()/2 per month [x] Interface Replacement ()/1 per month   [x] Replacement Nasal Pillows ()/2 per month         Tubing: [x] Heated ()/1 per 3 months    [] Standard ()/1 per 3 months [] Other:           Filters: [x] Non-disposable ()/1 per 6 months     [x] Ultra-Fine, Disposable ()/2 per month        Miscellaneous: [x] Chin Strap ()/ 1 per 6 months [] O2 bleed-in:       LPM   [] Oximetry on CPAP/Bilevel []  Other:          Start Order Date: 24    MEDICAL JUSTIFICATION:  I, the undersigned, certify that the above prescribed supplies are medically necessary for this patient’s wellbeing.  In my opinion, the supplies are both reasonable and necessary in reference to accepted standards of medicalpractice in

## 2024-08-22 ENCOUNTER — TELEPHONE (OUTPATIENT)
Dept: PULMONOLOGY | Age: 39
End: 2024-08-22

## 2024-08-22 NOTE — TELEPHONE ENCOUNTER
Home Sleep study showed mild ERIC (on a scale of mild, moderate and severe).  AHI was 8.6  per hr. (Average times per hr breathing was obstructed).  O2 Desaturations to 82% (lowest o2)   Dr Recommends:    Follow up with the patient's sleep physician to discuss results      Avoid sedatives, alcohol and caffeinated drinks at bedtime.    Recommend:  APAP  5/12         Avoid driving while sleepy.       The patient has been notified of this information and all questions answered.    Will call back with DME